# Patient Record
Sex: MALE | Race: WHITE | NOT HISPANIC OR LATINO | Employment: FULL TIME | URBAN - METROPOLITAN AREA
[De-identification: names, ages, dates, MRNs, and addresses within clinical notes are randomized per-mention and may not be internally consistent; named-entity substitution may affect disease eponyms.]

---

## 2023-06-14 LAB
APPEARANCE, URINE: ABNORMAL
BILIRUBIN, URINE: NEGATIVE
BLOOD, URINE: ABNORMAL
COLOR, URINE: YELLOW
GLUCOSE, URINE: NEGATIVE MG/DL
KETONES, URINE: NEGATIVE MG/DL
LEUKOCYTE ESTERASE, URINE: ABNORMAL
NITRITE, URINE: NEGATIVE
PH, URINE: 6 (ref 5–8)
PROTEIN, URINE: ABNORMAL MG/DL
RBC, URINE: 1808 /HPF (ref 0–5)
SPECIFIC GRAVITY, URINE: 1.01 (ref 1–1.03)
UROBILINOGEN, URINE: <2 MG/DL (ref 0–1.9)
WBC, URINE: ABNORMAL /HPF (ref 0–5)

## 2023-06-15 LAB — URINE CULTURE: NO GROWTH

## 2023-08-24 ENCOUNTER — HOSPITAL ENCOUNTER (OUTPATIENT)
Dept: DATA CONVERSION | Facility: HOSPITAL | Age: 63
End: 2023-08-24
Payer: COMMERCIAL

## 2023-08-24 DIAGNOSIS — N40.1 BENIGN PROSTATIC HYPERPLASIA WITH LOWER URINARY TRACT SYMPTOMS: ICD-10-CM

## 2023-10-24 ENCOUNTER — CLINICAL SUPPORT (OUTPATIENT)
Dept: PREADMISSION TESTING | Facility: HOSPITAL | Age: 63
End: 2023-10-24
Payer: COMMERCIAL

## 2023-10-24 VITALS
WEIGHT: 177.69 LBS | HEART RATE: 71 BPM | HEIGHT: 66 IN | DIASTOLIC BLOOD PRESSURE: 80 MMHG | RESPIRATION RATE: 16 BRPM | TEMPERATURE: 97.3 F | BODY MASS INDEX: 28.56 KG/M2 | SYSTOLIC BLOOD PRESSURE: 129 MMHG | OXYGEN SATURATION: 98 %

## 2023-10-24 DIAGNOSIS — Z01.818 PREOPERATIVE TESTING: Primary | ICD-10-CM

## 2023-10-24 LAB
ALBUMIN SERPL BCP-MCNC: 4.4 G/DL (ref 3.4–5)
ALP SERPL-CCNC: 63 U/L (ref 33–136)
ALT SERPL W P-5'-P-CCNC: 17 U/L (ref 10–52)
ANION GAP SERPL CALC-SCNC: 12 MMOL/L (ref 10–20)
APPEARANCE UR: CLEAR
AST SERPL W P-5'-P-CCNC: 19 U/L (ref 9–39)
BILIRUB SERPL-MCNC: 1.1 MG/DL (ref 0–1.2)
BILIRUB UR STRIP.AUTO-MCNC: NEGATIVE MG/DL
BUN SERPL-MCNC: 17 MG/DL (ref 6–23)
CALCIUM SERPL-MCNC: 9 MG/DL (ref 8.6–10.3)
CHLORIDE SERPL-SCNC: 103 MMOL/L (ref 98–107)
CO2 SERPL-SCNC: 29 MMOL/L (ref 21–32)
COLOR UR: YELLOW
CREAT SERPL-MCNC: 1.09 MG/DL (ref 0.5–1.3)
ERYTHROCYTE [DISTWIDTH] IN BLOOD BY AUTOMATED COUNT: 18.8 % (ref 11.5–14.5)
GFR SERPL CREATININE-BSD FRML MDRD: 77 ML/MIN/1.73M*2
GLUCOSE SERPL-MCNC: 100 MG/DL (ref 74–99)
GLUCOSE UR STRIP.AUTO-MCNC: NEGATIVE MG/DL
HCT VFR BLD AUTO: 39.3 % (ref 41–52)
HGB BLD-MCNC: 12.5 G/DL (ref 13.5–17.5)
HOLD SPECIMEN: NORMAL
KETONES UR STRIP.AUTO-MCNC: NEGATIVE MG/DL
LEUKOCYTE ESTERASE UR QL STRIP.AUTO: NEGATIVE
MCH RBC QN AUTO: 19.8 PG (ref 26–34)
MCHC RBC AUTO-ENTMCNC: 31.8 G/DL (ref 32–36)
MCV RBC AUTO: 62 FL (ref 80–100)
MUCOUS THREADS #/AREA URNS AUTO: NORMAL /LPF
NITRITE UR QL STRIP.AUTO: NEGATIVE
NRBC BLD-RTO: ABNORMAL /100{WBCS}
PH UR STRIP.AUTO: 6 [PH]
PLATELET # BLD AUTO: 230 X10*3/UL (ref 150–450)
PMV BLD AUTO: ABNORMAL FL
POTASSIUM SERPL-SCNC: 4.1 MMOL/L (ref 3.5–5.3)
PROT SERPL-MCNC: 6.5 G/DL (ref 6.4–8.2)
PROT UR STRIP.AUTO-MCNC: ABNORMAL MG/DL
RBC # BLD AUTO: 6.31 X10*6/UL (ref 4.5–5.9)
RBC # UR STRIP.AUTO: ABNORMAL /UL
RBC #/AREA URNS AUTO: NORMAL /HPF
SODIUM SERPL-SCNC: 140 MMOL/L (ref 136–145)
SP GR UR STRIP.AUTO: 1.02
SQUAMOUS #/AREA URNS AUTO: NORMAL /HPF
UROBILINOGEN UR STRIP.AUTO-MCNC: 0.2 MG/DL
WBC # BLD AUTO: 5.5 X10*3/UL (ref 4.4–11.3)
WBC #/AREA URNS AUTO: NORMAL /HPF

## 2023-10-24 PROCEDURE — 84443 ASSAY THYROID STIM HORMONE: CPT

## 2023-10-24 PROCEDURE — 81001 URINALYSIS AUTO W/SCOPE: CPT

## 2023-10-24 PROCEDURE — 80053 COMPREHEN METABOLIC PANEL: CPT

## 2023-10-24 PROCEDURE — 85027 COMPLETE CBC AUTOMATED: CPT

## 2023-10-24 PROCEDURE — 99203 OFFICE O/P NEW LOW 30 MIN: CPT

## 2023-10-24 PROCEDURE — 36415 COLL VENOUS BLD VENIPUNCTURE: CPT

## 2023-10-24 RX ORDER — TRIAMCINOLONE ACETONIDE 1 MG/G
1 CREAM TOPICAL DAILY PRN
COMMUNITY
Start: 2022-10-16

## 2023-10-24 RX ORDER — BISMUTH SUBSALICYLATE 262 MG
1 TABLET,CHEWABLE ORAL DAILY
COMMUNITY

## 2023-10-24 RX ORDER — HYDROCORTISONE 25 MG/G
1 CREAM TOPICAL AS NEEDED
COMMUNITY
Start: 2022-10-16

## 2023-10-24 RX ORDER — PREDNISONE 1 MG/1
1 TABLET ORAL EVERY MORNING
COMMUNITY
Start: 2023-10-11 | End: 2024-04-08

## 2023-10-24 RX ORDER — LEVOTHYROXINE SODIUM 175 UG/1
175 TABLET ORAL
COMMUNITY
Start: 2022-08-30 | End: 2023-11-01

## 2023-10-24 ASSESSMENT — DUKE ACTIVITY SCORE INDEX (DASI)
CAN YOU RUN A SHORT DISTANCE: YES
CAN YOU PARTICIPATE IN MODERATE RECREATIONAL ACTIVITIES LIKE GOLF, BOWLING, DANCING, DOUBLES TENNIS OR THROWING A BASEBALL OR FOOTBALL: YES
CAN YOU DO LIGHT WORK AROUND THE HOUSE LIKE DUSTING OR WASHING DISHES: YES
DASI METS SCORE: 8
CAN YOU WALK INDOORS, SUCH AS AROUND YOUR HOUSE: YES
CAN YOU DO YARD WORK LIKE RAKING LEAVES, WEEDING OR PUSHING A MOWER: YES
CAN YOU TAKE CARE OF YOURSELF (EAT, DRESS, BATHE, OR USE TOILET): YES
CAN YOU DO MODERATE WORK AROUND THE HOUSE LIKE VACUUMING, SWEEPING FLOORS OR CARRYING GROCERIES: YES
CAN YOU WALK A BLOCK OR TWO ON LEVEL GROUND: YES
TOTAL_SCORE: 42.7
CAN YOU CLIMB A FLIGHT OF STAIRS OR WALK UP A HILL: YES
CAN YOU HAVE SEXUAL RELATIONS: YES
CAN YOU PARTICIPATE IN STRENOUS SPORTS LIKE SWIMMING, SINGLES TENNIS, FOOTBALL, BASKETBALL, OR SKIING: NO
CAN YOU DO HEAVY WORK AROUND THE HOUSE LIKE SCRUBBING FLOORS OR LIFTING AND MOVING HEAVY FURNITURE: NO

## 2023-10-24 ASSESSMENT — CHADS2 SCORE
PRIOR STROKE OR TIA OR THROMBOEMBOLISM: NO
CHF: NO
HYPERTENSION: NO
AGE GREATER THAN OR EQUAL TO 75: NO
DIABETES: NO
CHADS2 SCORE: 0

## 2023-10-24 ASSESSMENT — PAIN - FUNCTIONAL ASSESSMENT: PAIN_FUNCTIONAL_ASSESSMENT: 0-10

## 2023-10-24 ASSESSMENT — PAIN SCALES - GENERAL: PAINLEVEL_OUTOF10: 0 - NO PAIN

## 2023-10-24 NOTE — H&P (VIEW-ONLY)
CPM/PAT Evaluation       Name: Dani Young (Dani Young)  /Age: 1960/63 y.o.     In-Person       Chief Complaint: BPH with LUTS    HPI  Patient is a 62 y/o alert and oriented male coming in for PAT for a holmium laser enucleation of the prostate scheduled on 2023 with Dr Magana. He reports urinary frequency, urgency, and nocturia without dysuria, hematuria, fevers, chills, and myalgias. Patient denies Cx pain, SOB, MUNOZ, and NVDC. Patient also denies Hx DVT/PE. Current medications were reviewed and a presurgical medication schedule was provided. He has no questions at this time.    PERSONAL REPORTS OF REACTIONS TO ANESTHESIA-PONV  NO PERSONAL REPORTS OF FAMILY HISTORY OF REACTIONS TO ANESTHESIA  NO PERSONAL REPORTS OF METAL, NICKEL, OR SHELLFISH ALLERGY  NONSMOKER-NEVER SMOKED  NO ETOH/DRUGS    Past Medical History:   Diagnosis Date    Anemia     Arthritis     BPH (benign prostatic hyperplasia)     Hypocalcemia 08/10/2018    Hypocalcemia    Hypothyroidism     Localized enlarged lymph nodes 2016    Cervical lymphadenopathy    Malignant melanoma of other part of trunk (CMS/HCC) 05/10/2019    Malignant melanoma of back    Nephrolithiasis     Nontoxic multinodular goiter 2014    Nontoxic multinodular goiter    Other conditions influencing health status     Nephrolithiasis    Personal history of malignant neoplasm of thyroid 05/10/2019    History of malignant neoplasm of thyroid    Personal history of malignant neoplasm of thyroid 2017    History of malignant neoplasm of thyroid    Personal history of other diseases of the digestive system 2014    History of inguinal hernia    Personal history of other specified conditions 04/10/2017    History of urinary frequency    PONV (postoperative nausea and vomiting)      Past Surgical History:   Procedure Laterality Date    COLONOSCOPY      LASIK      OTHER SURGICAL HISTORY  05/10/2019    Thyroidectomy total    OTHER SURGICAL HISTORY  05/10/2019     Inguinal hernia repair    PROSTATE SURGERY      SHOULDER SURGERY  05/10/2019    Shoulder Surgery    TOTAL HIP ARTHROPLASTY  05/10/2019    Hip Replacement    US GUIDED THYROID BIOPSY  09/10/2013    US GUIDED THYROID BIOPSY 9/10/2013 [facility] ANCILLARY LEGACY     Family History   Problem Relation Name Age of Onset    Diabetes Mother OLYA     Heart disease Father EDNA      Allergies   Allergen Reactions    Penicillins Rash and Unknown     Prior to Admission medications    Medication Sig Start Date End Date Taking? Authorizing Provider   hydrocortisone 2.5 % cream Apply 1 Application topically if needed for irritation. 10/16/22  Yes Historical Provider, MD   multivitamin tablet Take 1 tablet by mouth once daily.   Yes Historical Provider, MD   predniSONE (Deltasone) 1 mg tablet Take 1 tablet (1 mg) by mouth once daily in the morning. FOR PMR 10/11/23 4/8/24 Yes Historical Provider, MD   Synthroid 175 mcg tablet Take 1 tablet (175 mcg) by mouth once daily in the morning. Take before meals. 8/30/22  Yes Historical Provider, MD   triamcinolone (Kenalog) 0.1 % cream Apply 1 Application topically once daily as needed. 10/16/22  Yes Historical Provider, MD      Review of Systems   Constitutional: Negative for chills, decreased appetite, diaphoresis, fever and malaise/fatigue.   Eyes:  Negative for blurred vision and double vision.   Cardiovascular:  Negative for chest pain, claudication, cyanosis, dyspnea on exertion, irregular heartbeat, leg swelling, near-syncope and palpitations.   Respiratory:  Negative for cough, hemoptysis, shortness of breath and wheezing.    Endocrine: Negative for cold intolerance, heat intolerance, polydipsia, polyphagia and polyuria.   Gastrointestinal:  Negative for abdominal pain, constipation, diarrhea, dysphagia, nausea and vomiting.   Genitourinary:  Positive for frequency, urgency, and nocturia. Negative for bladder incontinence, dysuria, hematuria, incomplete emptying, pelvic pain.  "  Neurological:  Negative for headaches, light-headedness, paresthesias, sensory change and weakness.   Psychiatric/Behavioral:  Negative for altered mental status.       Vitals and nursing note reviewed. Physical exam within normal limits.   Constitutional:       Appearance: Normal appearance. He is normal weight.   HENT:      Head: Normocephalic and atraumatic.      Mouth/Throat:      Mouth: Mucous membranes are moist.      Pharynx: Oropharynx is clear.   Eyes:      Extraocular Movements: Extraocular movements intact.      Conjunctiva/sclera: Conjunctivae normal.      Pupils: Pupils are equal, round, and reactive to light.   Cardiovascular:      Rate and Rhythm: Normal rate and regular rhythm.      Pulses: Normal pulses.      Heart sounds: Normal heart sounds.   Pulmonary:      Effort: Pulmonary effort is normal.      Breath sounds: Normal breath sounds.   Abdominal:      General: Abdomen is flat. Bowel sounds are normal.      Palpations: Abdomen is soft.   Musculoskeletal:      Cervical back: Normal range of motion and neck supple.   Skin:     General: Skin is warm and dry.      Capillary Refill: Capillary refill takes less than 2 seconds.   Neurological:      General: No focal deficit present.      Mental Status: He is alert and oriented to person, place, and time. Mental status is at baseline.   Psychiatric:         Mood and Affect: Mood normal.         Behavior: Behavior normal.         Thought Content: Thought content normal.         Judgment: Judgment normal.     PAT AIRWAY:   Airway:     Mallampati::  I    TM distance::  >3 FB    Neck ROM::  Full  No missing teeth    Visit Vitals  /80   Pulse 71   Temp 36.3 °C (97.3 °F) (Tympanic)   Resp 16   Ht 1.676 m (5' 6\")   Wt 80.6 kg (177 lb 11.1 oz)   SpO2 98%   BMI 28.68 kg/m²   Smoking Status Never   BSA 1.94 m²        DASI Risk Score      Flowsheet Row Most Recent Value   DASI SCORE 42.7   METS Score (Will be calculated only when all the questions are " answered) 8          Caprini DVT Assessment      Flowsheet Row Most Recent Value   DVT Score 5   Current Status Major surgery planned, including arthroscopic and laproscopic (1-2 hours)   Age 60-75 years   BMI 30 or less          Modified Frailty Index      Flowsheet Row Most Recent Value   Modified Frailty Index Calculator 0          CHADS2 Stroke Risk         N/A 3 - 100%: High Risk   2 - 3%: Medium Risk   0 - 2%: Low Risk     Last Change: N/A          This score determines the patient's risk of having a stroke if the patient has atrial fibrillation.        This score is not applicable to this patient. Components are not calculated.          Revised Cardiac Risk Index      Flowsheet Row Most Recent Value   Revised Cardiac Risk Calculator 0          Apfel Simplified Score    No data to display       Risk Analysis Index Results This Encounter    No data found in the last 1 encounters.       Stop Bang Score      Flowsheet Row Most Recent Value   Do you snore loudly? 0   Do you often feel tired or fatigued after your sleep? 0   Has anyone ever observed you stop breathing in your sleep? 0   Do you have or are you being treated for high blood pressure? 0   Recent BMI (Calculated) 28.6   Is BMI greater than 35 kg/m2? 0=No   Age older than 50 years old? 1=Yes   Is your neck circumference greater than 17 inches (Male) or 16 inches (Female)? 0            Assessment and Plan:     BPH with LUTS- holmium laser enucleation of the prostate scheduled on 11/2/2023 with Dr Magana.    Hypothyroidism-Previous total thyroidectomy. Managed with Synthroid 175 mcg tablet. Will check TSH in PAT    Polymyalgia rheumatica-Managed with predniSONE (Deltasone) 1 mg tablet    Preoperative risk assessment  ASA II  RCRI-0 POINTS CLASS I RISK 3.9%  STOP-BANGS-1 POINTS LOW RISK FOR MOISES  NSQIP-PREDICTED LENGTH OF STAY 0.5 DAYS  ARISCAT-3 POINTS LOW RISK 1.6%  DASI-42.7 POINTS. 7.99 METS  HOLLY-0.1%  JHFRAT-3 POINTS LOW RISK FOR  FALLS  CLEARANCE-NA  PAT TESTING-CBC, CMP, UA, TSH    *CLEARED FOR SURGERY PENDING LABS/EKG-LABS REVIEWED, STABLE. OK TO PROCEED.    *FACE TO FACE TIME 20 MINUTES.

## 2023-10-24 NOTE — PREPROCEDURE INSTRUCTIONS
Medication List            Accurate as of October 24, 2023  7:31 AM. Always use your most recent med list.                hydrocortisone 2.5 % cream  Medication Adjustments for Surgery: Continue until night before surgery     multivitamin tablet  Medication Adjustments for Surgery: Stop 7 days before surgery     predniSONE 1 mg tablet  Commonly known as: Deltasone  Medication Adjustments for Surgery: Take morning of surgery with sip of water, no other fluids     Synthroid 175 mcg tablet  Generic drug: levothyroxine  Medication Adjustments for Surgery: Take morning of surgery with sip of water, no other fluids     triamcinolone 0.1 % cream  Commonly known as: Kenalog  Medication Adjustments for Surgery: Continue until night before surgery                              NPO Instructions:    Do not eat any food after midnight the night before your surgery/procedure.    Additional Instructions:     Seven/Six Days before Surgery:  Review your medication instructions, stop indicated medications  Five Days before Surgery:  Review your medication instructions, stop indicated medications  Three Days before Surgery:  Review your medication instructions, stop indicated medications  The Day before Surgery:  Review your medication instructions, stop indicated medications  You will be contacted regarding the time of your arrival to facility and surgery time  Do not eat any food after Midnight  Day of Surgery:  Review your medication instructions, take indicated medications  Wear  comfortable loose fitting clothing  Do not use moisturizers, creams, lotions or perfume  All jewelry and valuables should be left at home

## 2023-10-24 NOTE — CPM/PAT H&P
CPM/PAT Evaluation       Name: Dani Young (Dani Young)  /Age: 1960/63 y.o.     In-Person       Chief Complaint: BPH with LUTS    HPI  Patient is a 64 y/o alert and oriented male coming in for PAT for a holmium laser enucleation of the prostate scheduled on 2023 with Dr Magana. He reports urinary frequency, urgency, and nocturia without dysuria, hematuria, fevers, chills, and myalgias. Patient denies Cx pain, SOB, MUNOZ, and NVDC. Patient also denies Hx DVT/PE. Current medications were reviewed and a presurgical medication schedule was provided. He has no questions at this time.    PERSONAL REPORTS OF REACTIONS TO ANESTHESIA-PONV  NO PERSONAL REPORTS OF FAMILY HISTORY OF REACTIONS TO ANESTHESIA  NO PERSONAL REPORTS OF METAL, NICKEL, OR SHELLFISH ALLERGY  NONSMOKER-NEVER SMOKED  NO ETOH/DRUGS    Past Medical History:   Diagnosis Date    Anemia     Arthritis     BPH (benign prostatic hyperplasia)     Hypocalcemia 08/10/2018    Hypocalcemia    Hypothyroidism     Localized enlarged lymph nodes 2016    Cervical lymphadenopathy    Malignant melanoma of other part of trunk (CMS/HCC) 05/10/2019    Malignant melanoma of back    Nephrolithiasis     Nontoxic multinodular goiter 2014    Nontoxic multinodular goiter    Other conditions influencing health status     Nephrolithiasis    Personal history of malignant neoplasm of thyroid 05/10/2019    History of malignant neoplasm of thyroid    Personal history of malignant neoplasm of thyroid 2017    History of malignant neoplasm of thyroid    Personal history of other diseases of the digestive system 2014    History of inguinal hernia    Personal history of other specified conditions 04/10/2017    History of urinary frequency    PONV (postoperative nausea and vomiting)      Past Surgical History:   Procedure Laterality Date    COLONOSCOPY      LASIK      OTHER SURGICAL HISTORY  05/10/2019    Thyroidectomy total    OTHER SURGICAL HISTORY  05/10/2019     Inguinal hernia repair    PROSTATE SURGERY      SHOULDER SURGERY  05/10/2019    Shoulder Surgery    TOTAL HIP ARTHROPLASTY  05/10/2019    Hip Replacement    US GUIDED THYROID BIOPSY  09/10/2013    US GUIDED THYROID BIOPSY 9/10/2013 [facility] ANCILLARY LEGACY     Family History   Problem Relation Name Age of Onset    Diabetes Mother OLYA     Heart disease Father EDNA      Allergies   Allergen Reactions    Penicillins Rash and Unknown     Prior to Admission medications    Medication Sig Start Date End Date Taking? Authorizing Provider   hydrocortisone 2.5 % cream Apply 1 Application topically if needed for irritation. 10/16/22  Yes Historical Provider, MD   multivitamin tablet Take 1 tablet by mouth once daily.   Yes Historical Provider, MD   predniSONE (Deltasone) 1 mg tablet Take 1 tablet (1 mg) by mouth once daily in the morning. FOR PMR 10/11/23 4/8/24 Yes Historical Provider, MD   Synthroid 175 mcg tablet Take 1 tablet (175 mcg) by mouth once daily in the morning. Take before meals. 8/30/22  Yes Historical Provider, MD   triamcinolone (Kenalog) 0.1 % cream Apply 1 Application topically once daily as needed. 10/16/22  Yes Historical Provider, MD      Review of Systems   Constitutional: Negative for chills, decreased appetite, diaphoresis, fever and malaise/fatigue.   Eyes:  Negative for blurred vision and double vision.   Cardiovascular:  Negative for chest pain, claudication, cyanosis, dyspnea on exertion, irregular heartbeat, leg swelling, near-syncope and palpitations.   Respiratory:  Negative for cough, hemoptysis, shortness of breath and wheezing.    Endocrine: Negative for cold intolerance, heat intolerance, polydipsia, polyphagia and polyuria.   Gastrointestinal:  Negative for abdominal pain, constipation, diarrhea, dysphagia, nausea and vomiting.   Genitourinary:  Positive for frequency, urgency, and nocturia. Negative for bladder incontinence, dysuria, hematuria, incomplete emptying, pelvic pain.  "  Neurological:  Negative for headaches, light-headedness, paresthesias, sensory change and weakness.   Psychiatric/Behavioral:  Negative for altered mental status.       Vitals and nursing note reviewed. Physical exam within normal limits.   Constitutional:       Appearance: Normal appearance. He is normal weight.   HENT:      Head: Normocephalic and atraumatic.      Mouth/Throat:      Mouth: Mucous membranes are moist.      Pharynx: Oropharynx is clear.   Eyes:      Extraocular Movements: Extraocular movements intact.      Conjunctiva/sclera: Conjunctivae normal.      Pupils: Pupils are equal, round, and reactive to light.   Cardiovascular:      Rate and Rhythm: Normal rate and regular rhythm.      Pulses: Normal pulses.      Heart sounds: Normal heart sounds.   Pulmonary:      Effort: Pulmonary effort is normal.      Breath sounds: Normal breath sounds.   Abdominal:      General: Abdomen is flat. Bowel sounds are normal.      Palpations: Abdomen is soft.   Musculoskeletal:      Cervical back: Normal range of motion and neck supple.   Skin:     General: Skin is warm and dry.      Capillary Refill: Capillary refill takes less than 2 seconds.   Neurological:      General: No focal deficit present.      Mental Status: He is alert and oriented to person, place, and time. Mental status is at baseline.   Psychiatric:         Mood and Affect: Mood normal.         Behavior: Behavior normal.         Thought Content: Thought content normal.         Judgment: Judgment normal.     PAT AIRWAY:   Airway:     Mallampati::  I    TM distance::  >3 FB    Neck ROM::  Full  No missing teeth    Visit Vitals  /80   Pulse 71   Temp 36.3 °C (97.3 °F) (Tympanic)   Resp 16   Ht 1.676 m (5' 6\")   Wt 80.6 kg (177 lb 11.1 oz)   SpO2 98%   BMI 28.68 kg/m²   Smoking Status Never   BSA 1.94 m²        DASI Risk Score      Flowsheet Row Most Recent Value   DASI SCORE 42.7   METS Score (Will be calculated only when all the questions are " answered) 8          Caprini DVT Assessment      Flowsheet Row Most Recent Value   DVT Score 5   Current Status Major surgery planned, including arthroscopic and laproscopic (1-2 hours)   Age 60-75 years   BMI 30 or less          Modified Frailty Index      Flowsheet Row Most Recent Value   Modified Frailty Index Calculator 0          CHADS2 Stroke Risk         N/A 3 - 100%: High Risk   2 - 3%: Medium Risk   0 - 2%: Low Risk     Last Change: N/A          This score determines the patient's risk of having a stroke if the patient has atrial fibrillation.        This score is not applicable to this patient. Components are not calculated.          Revised Cardiac Risk Index      Flowsheet Row Most Recent Value   Revised Cardiac Risk Calculator 0          Apfel Simplified Score    No data to display       Risk Analysis Index Results This Encounter    No data found in the last 1 encounters.       Stop Bang Score      Flowsheet Row Most Recent Value   Do you snore loudly? 0   Do you often feel tired or fatigued after your sleep? 0   Has anyone ever observed you stop breathing in your sleep? 0   Do you have or are you being treated for high blood pressure? 0   Recent BMI (Calculated) 28.6   Is BMI greater than 35 kg/m2? 0=No   Age older than 50 years old? 1=Yes   Is your neck circumference greater than 17 inches (Male) or 16 inches (Female)? 0            Assessment and Plan:     BPH with LUTS- holmium laser enucleation of the prostate scheduled on 11/2/2023 with Dr Magana.    Hypothyroidism-Previous total thyroidectomy. Managed with Synthroid 175 mcg tablet. Will check TSH in PAT    Polymyalgia rheumatica-Managed with predniSONE (Deltasone) 1 mg tablet    Preoperative risk assessment  ASA II  RCRI-0 POINTS CLASS I RISK 3.9%  STOP-BANGS-1 POINTS LOW RISK FOR MOISES  NSQIP-PREDICTED LENGTH OF STAY 0.5 DAYS  ARISCAT-3 POINTS LOW RISK 1.6%  DASI-42.7 POINTS. 7.99 METS  HOLLY-0.1%  JHFRAT-3 POINTS LOW RISK FOR  FALLS  CLEARANCE-NA  PAT TESTING-CBC, CMP, UA, TSH    *CLEARED FOR SURGERY PENDING LABS/EKG-LABS REVIEWED, STABLE. OK TO PROCEED.    *FACE TO FACE TIME 20 MINUTES.

## 2023-10-26 LAB — TSH SERPL-ACNC: 7.69 MIU/L

## 2023-10-31 ENCOUNTER — PREP FOR PROCEDURE (OUTPATIENT)
Dept: UROLOGY | Facility: CLINIC | Age: 63
End: 2023-10-31
Payer: COMMERCIAL

## 2023-10-31 ENCOUNTER — ANESTHESIA EVENT (OUTPATIENT)
Dept: OPERATING ROOM | Facility: HOSPITAL | Age: 63
End: 2023-10-31
Payer: COMMERCIAL

## 2023-10-31 RX ORDER — SODIUM CHLORIDE, SODIUM LACTATE, POTASSIUM CHLORIDE, CALCIUM CHLORIDE 600; 310; 30; 20 MG/100ML; MG/100ML; MG/100ML; MG/100ML
100 INJECTION, SOLUTION INTRAVENOUS CONTINUOUS
Status: CANCELLED | OUTPATIENT
Start: 2023-10-31

## 2023-10-31 RX ORDER — CIPROFLOXACIN 2 MG/ML
400 INJECTION, SOLUTION INTRAVENOUS ONCE
Status: CANCELLED | OUTPATIENT
Start: 2023-10-31 | End: 2023-10-31

## 2023-11-01 DIAGNOSIS — E03.9 HYPOTHYROIDISM, UNSPECIFIED TYPE: Primary | ICD-10-CM

## 2023-11-01 RX ORDER — LEVOTHYROXINE SODIUM 175 UG/1
175 TABLET ORAL DAILY
Qty: 90 TABLET | Refills: 0 | Status: SHIPPED | OUTPATIENT
Start: 2023-11-01 | End: 2023-12-07 | Stop reason: SDUPTHER

## 2023-11-02 ENCOUNTER — ANESTHESIA (OUTPATIENT)
Dept: OPERATING ROOM | Facility: HOSPITAL | Age: 63
End: 2023-11-02
Payer: COMMERCIAL

## 2023-11-02 ENCOUNTER — HOSPITAL ENCOUNTER (OUTPATIENT)
Facility: HOSPITAL | Age: 63
Setting detail: OUTPATIENT SURGERY
Discharge: HOME | End: 2023-11-02
Attending: UROLOGY | Admitting: UROLOGY
Payer: COMMERCIAL

## 2023-11-02 VITALS
DIASTOLIC BLOOD PRESSURE: 98 MMHG | TEMPERATURE: 97 F | HEART RATE: 65 BPM | SYSTOLIC BLOOD PRESSURE: 126 MMHG | RESPIRATION RATE: 16 BRPM | OXYGEN SATURATION: 100 %

## 2023-11-02 DIAGNOSIS — N40.1 ENLARGED PROSTATE WITH URINARY OBSTRUCTION: ICD-10-CM

## 2023-11-02 DIAGNOSIS — N13.8 ENLARGED PROSTATE WITH URINARY OBSTRUCTION: ICD-10-CM

## 2023-11-02 PROBLEM — L30.9 ECZEMA: Status: ACTIVE | Noted: 2019-06-13

## 2023-11-02 PROBLEM — N40.0 BENIGN LOCALIZED HYPERPLASIA OF PROSTATE: Status: ACTIVE | Noted: 2023-11-02

## 2023-11-02 PROBLEM — R31.9 HEMATURIA: Status: ACTIVE | Noted: 2023-11-02

## 2023-11-02 PROBLEM — D18.00 ANGIOMA: Status: ACTIVE | Noted: 2019-06-13

## 2023-11-02 PROBLEM — Z85.820 PERSONAL HISTORY OF MALIGNANT MELANOMA OF SKIN: Status: ACTIVE | Noted: 2019-06-13

## 2023-11-02 PROBLEM — E03.9 HYPOTHYROIDISM: Status: ACTIVE | Noted: 2023-11-02

## 2023-11-02 PROBLEM — M50.30 DDD (DEGENERATIVE DISC DISEASE), CERVICAL: Status: ACTIVE | Noted: 2023-11-02

## 2023-11-02 PROBLEM — Z04.9 CONDITION NOT FOUND: Status: ACTIVE | Noted: 2019-06-13

## 2023-11-02 PROBLEM — R80.9 PROTEINURIA: Status: ACTIVE | Noted: 2023-11-02

## 2023-11-02 PROBLEM — M47.812 SPONDYLOSIS WITHOUT MYELOPATHY OR RADICULOPATHY, CERVICAL REGION: Status: ACTIVE | Noted: 2023-11-02

## 2023-11-02 PROBLEM — D36.7 BENIGN NEOPLASM OF LOWER EXTREMITY: Status: ACTIVE | Noted: 2019-06-13

## 2023-11-02 PROBLEM — R31.0 INTERMITTENT GROSS HEMATURIA: Status: ACTIVE | Noted: 2023-11-02

## 2023-11-02 PROBLEM — C73 THYROID CANCER (MULTI): Status: ACTIVE | Noted: 2023-11-02

## 2023-11-02 PROBLEM — D36.7 BENIGN NEOPLASM OF UPPER EXTREMITY: Status: ACTIVE | Noted: 2019-06-13

## 2023-11-02 PROBLEM — D64.9 ANEMIA: Status: ACTIVE | Noted: 2023-11-02

## 2023-11-02 PROBLEM — M54.12 CERVICAL RADICULOPATHY: Status: ACTIVE | Noted: 2023-11-02

## 2023-11-02 PROBLEM — D36.7 BENIGN NEOPLASM OF TRUNK: Status: ACTIVE | Noted: 2019-06-13

## 2023-11-02 PROBLEM — G56.03 BILATERAL CARPAL TUNNEL SYNDROME: Status: ACTIVE | Noted: 2023-11-02

## 2023-11-02 PROBLEM — L90.5 SCAR: Status: ACTIVE | Noted: 2019-06-13

## 2023-11-02 PROBLEM — K76.89 LIVER NODULE: Status: ACTIVE | Noted: 2023-11-02

## 2023-11-02 PROBLEM — R93.1 ELEVATED CORONARY ARTERY CALCIUM SCORE: Status: ACTIVE | Noted: 2023-11-02

## 2023-11-02 PROBLEM — D22.5 MELANOCYTIC NEVI OF TRUNK: Status: ACTIVE | Noted: 2019-06-13

## 2023-11-02 PROBLEM — D22.60 MELANOCYTIC NEVI OF UNSPECIFIED UPPER LIMB, INCLUDING SHOULDER: Status: ACTIVE | Noted: 2019-06-13

## 2023-11-02 PROCEDURE — 51702 INSERT TEMP BLADDER CATH: CPT

## 2023-11-02 PROCEDURE — 3700000001 HC GENERAL ANESTHESIA TIME - INITIAL BASE CHARGE: Performed by: UROLOGY

## 2023-11-02 PROCEDURE — 7100000002 HC RECOVERY ROOM TIME - EACH INCREMENTAL 1 MINUTE: Performed by: UROLOGY

## 2023-11-02 PROCEDURE — 3600000009 HC OR TIME - EACH INCREMENTAL 1 MINUTE - PROCEDURE LEVEL FOUR: Performed by: UROLOGY

## 2023-11-02 PROCEDURE — 7100000001 HC RECOVERY ROOM TIME - INITIAL BASE CHARGE: Performed by: UROLOGY

## 2023-11-02 PROCEDURE — 2500000004 HC RX 250 GENERAL PHARMACY W/ HCPCS (ALT 636 FOR OP/ED): Performed by: STUDENT IN AN ORGANIZED HEALTH CARE EDUCATION/TRAINING PROGRAM

## 2023-11-02 PROCEDURE — A52648 PR LASER VAPORIZATION SURGERY PROSTATE, COMPLETE: Performed by: STUDENT IN AN ORGANIZED HEALTH CARE EDUCATION/TRAINING PROGRAM

## 2023-11-02 PROCEDURE — 3700000002 HC GENERAL ANESTHESIA TIME - EACH INCREMENTAL 1 MINUTE: Performed by: UROLOGY

## 2023-11-02 PROCEDURE — 3600000004 HC OR TIME - INITIAL BASE CHARGE - PROCEDURE LEVEL FOUR: Performed by: UROLOGY

## 2023-11-02 PROCEDURE — 2500000004 HC RX 250 GENERAL PHARMACY W/ HCPCS (ALT 636 FOR OP/ED): Performed by: NURSE ANESTHETIST, CERTIFIED REGISTERED

## 2023-11-02 PROCEDURE — 88307 TISSUE EXAM BY PATHOLOGIST: CPT | Mod: TC | Performed by: UROLOGY

## 2023-11-02 PROCEDURE — 7100000009 HC PHASE TWO TIME - INITIAL BASE CHARGE: Performed by: UROLOGY

## 2023-11-02 PROCEDURE — 2500000004 HC RX 250 GENERAL PHARMACY W/ HCPCS (ALT 636 FOR OP/ED): Performed by: UROLOGY

## 2023-11-02 PROCEDURE — 52649 PROSTATE LASER ENUCLEATION: CPT | Performed by: UROLOGY

## 2023-11-02 PROCEDURE — A52648 PR LASER VAPORIZATION SURGERY PROSTATE, COMPLETE: Performed by: NURSE ANESTHETIST, CERTIFIED REGISTERED

## 2023-11-02 PROCEDURE — 2500000005 HC RX 250 GENERAL PHARMACY W/O HCPCS: Performed by: NURSE ANESTHETIST, CERTIFIED REGISTERED

## 2023-11-02 PROCEDURE — C1782 MORCELLATOR: HCPCS | Performed by: UROLOGY

## 2023-11-02 PROCEDURE — 88307 TISSUE EXAM BY PATHOLOGIST: CPT | Performed by: PATHOLOGY

## 2023-11-02 PROCEDURE — 51700 IRRIGATION OF BLADDER: CPT

## 2023-11-02 PROCEDURE — 2720000007 HC OR 272 NO HCPCS: Performed by: UROLOGY

## 2023-11-02 PROCEDURE — 2500000001 HC RX 250 WO HCPCS SELF ADMINISTERED DRUGS (ALT 637 FOR MEDICARE OP): Performed by: UROLOGY

## 2023-11-02 PROCEDURE — C1758 CATHETER, URETERAL: HCPCS | Performed by: UROLOGY

## 2023-11-02 PROCEDURE — 88307 TISSUE EXAM BY PATHOLOGIST: CPT | Mod: TC,SUR | Performed by: UROLOGY

## 2023-11-02 PROCEDURE — C1769 GUIDE WIRE: HCPCS | Performed by: UROLOGY

## 2023-11-02 PROCEDURE — 7100000010 HC PHASE TWO TIME - EACH INCREMENTAL 1 MINUTE: Performed by: UROLOGY

## 2023-11-02 RX ORDER — MEPERIDINE HYDROCHLORIDE 25 MG/ML
12.5 INJECTION INTRAMUSCULAR; INTRAVENOUS; SUBCUTANEOUS EVERY 10 MIN PRN
Status: DISCONTINUED | OUTPATIENT
Start: 2023-11-02 | End: 2023-11-02 | Stop reason: HOSPADM

## 2023-11-02 RX ORDER — MELOXICAM 15 MG/1
1 TABLET ORAL DAILY
COMMUNITY
Start: 2022-02-21 | End: 2023-12-01 | Stop reason: ALTCHOICE

## 2023-11-02 RX ORDER — SCOLOPAMINE TRANSDERMAL SYSTEM 1 MG/1
1 PATCH, EXTENDED RELEASE TRANSDERMAL
Status: DISCONTINUED | OUTPATIENT
Start: 2023-11-02 | End: 2023-11-02 | Stop reason: HOSPADM

## 2023-11-02 RX ORDER — DIPHENHYDRAMINE HYDROCHLORIDE 50 MG/ML
12.5 INJECTION INTRAMUSCULAR; INTRAVENOUS ONCE AS NEEDED
Status: DISCONTINUED | OUTPATIENT
Start: 2023-11-02 | End: 2023-11-02 | Stop reason: HOSPADM

## 2023-11-02 RX ORDER — IPRATROPIUM BROMIDE 0.5 MG/2.5ML
500 SOLUTION RESPIRATORY (INHALATION) ONCE
Status: DISCONTINUED | OUTPATIENT
Start: 2023-11-02 | End: 2023-11-02 | Stop reason: HOSPADM

## 2023-11-02 RX ORDER — SODIUM CHLORIDE, SODIUM LACTATE, POTASSIUM CHLORIDE, CALCIUM CHLORIDE 600; 310; 30; 20 MG/100ML; MG/100ML; MG/100ML; MG/100ML
100 INJECTION, SOLUTION INTRAVENOUS CONTINUOUS
Status: DISCONTINUED | OUTPATIENT
Start: 2023-11-02 | End: 2023-11-02 | Stop reason: HOSPADM

## 2023-11-02 RX ORDER — LABETALOL HYDROCHLORIDE 5 MG/ML
5 INJECTION, SOLUTION INTRAVENOUS ONCE AS NEEDED
Status: DISCONTINUED | OUTPATIENT
Start: 2023-11-02 | End: 2023-11-02 | Stop reason: HOSPADM

## 2023-11-02 RX ORDER — LIDOCAINE HYDROCHLORIDE 20 MG/ML
JELLY TOPICAL AS NEEDED
Status: DISCONTINUED | OUTPATIENT
Start: 2023-11-02 | End: 2023-11-02 | Stop reason: HOSPADM

## 2023-11-02 RX ORDER — ONDANSETRON HYDROCHLORIDE 2 MG/ML
4 INJECTION, SOLUTION INTRAVENOUS ONCE AS NEEDED
Status: DISCONTINUED | OUTPATIENT
Start: 2023-11-02 | End: 2023-11-02 | Stop reason: HOSPADM

## 2023-11-02 RX ORDER — HYDRALAZINE HYDROCHLORIDE 20 MG/ML
5 INJECTION INTRAMUSCULAR; INTRAVENOUS EVERY 30 MIN PRN
Status: DISCONTINUED | OUTPATIENT
Start: 2023-11-02 | End: 2023-11-02 | Stop reason: HOSPADM

## 2023-11-02 RX ORDER — CIPROFLOXACIN 2 MG/ML
400 INJECTION, SOLUTION INTRAVENOUS ONCE
Status: COMPLETED | OUTPATIENT
Start: 2023-11-02 | End: 2023-11-02

## 2023-11-02 RX ORDER — PROPOFOL 10 MG/ML
INJECTION, EMULSION INTRAVENOUS AS NEEDED
Status: DISCONTINUED | OUTPATIENT
Start: 2023-11-02 | End: 2023-11-02

## 2023-11-02 RX ORDER — NEOSTIGMINE METHYLSULFATE 1 MG/ML
INJECTION, SOLUTION INTRAVENOUS AS NEEDED
Status: DISCONTINUED | OUTPATIENT
Start: 2023-11-02 | End: 2023-11-02

## 2023-11-02 RX ORDER — FENTANYL CITRATE 50 UG/ML
INJECTION, SOLUTION INTRAMUSCULAR; INTRAVENOUS AS NEEDED
Status: DISCONTINUED | OUTPATIENT
Start: 2023-11-02 | End: 2023-11-02

## 2023-11-02 RX ORDER — ONDANSETRON HYDROCHLORIDE 2 MG/ML
INJECTION, SOLUTION INTRAVENOUS AS NEEDED
Status: DISCONTINUED | OUTPATIENT
Start: 2023-11-02 | End: 2023-11-02

## 2023-11-02 RX ORDER — ALBUTEROL SULFATE 0.83 MG/ML
2.5 SOLUTION RESPIRATORY (INHALATION) ONCE AS NEEDED
Status: DISCONTINUED | OUTPATIENT
Start: 2023-11-02 | End: 2023-11-02 | Stop reason: HOSPADM

## 2023-11-02 RX ORDER — CIPROFLOXACIN 500 MG/1
500 TABLET ORAL 2 TIMES DAILY
Qty: 14 TABLET | Refills: 0 | Status: SHIPPED | OUTPATIENT
Start: 2023-11-02 | End: 2023-11-09

## 2023-11-02 RX ORDER — ROCURONIUM BROMIDE 10 MG/ML
INJECTION, SOLUTION INTRAVENOUS AS NEEDED
Status: DISCONTINUED | OUTPATIENT
Start: 2023-11-02 | End: 2023-11-02

## 2023-11-02 RX ORDER — DEXAMETHASONE SODIUM PHOSPHATE 4 MG/ML
INJECTION, SOLUTION INTRA-ARTICULAR; INTRALESIONAL; INTRAMUSCULAR; INTRAVENOUS; SOFT TISSUE AS NEEDED
Status: DISCONTINUED | OUTPATIENT
Start: 2023-11-02 | End: 2023-11-02

## 2023-11-02 RX ORDER — MIDAZOLAM HYDROCHLORIDE 1 MG/ML
INJECTION, SOLUTION INTRAMUSCULAR; INTRAVENOUS AS NEEDED
Status: DISCONTINUED | OUTPATIENT
Start: 2023-11-02 | End: 2023-11-02

## 2023-11-02 RX ORDER — FENTANYL CITRATE 50 UG/ML
25 INJECTION, SOLUTION INTRAMUSCULAR; INTRAVENOUS EVERY 5 MIN PRN
Status: DISCONTINUED | OUTPATIENT
Start: 2023-11-02 | End: 2023-11-02 | Stop reason: HOSPADM

## 2023-11-02 RX ORDER — FENTANYL CITRATE 50 UG/ML
50 INJECTION, SOLUTION INTRAMUSCULAR; INTRAVENOUS EVERY 5 MIN PRN
Status: DISCONTINUED | OUTPATIENT
Start: 2023-11-02 | End: 2023-11-02 | Stop reason: HOSPADM

## 2023-11-02 RX ORDER — GLYCOPYRROLATE 0.2 MG/ML
INJECTION INTRAMUSCULAR; INTRAVENOUS AS NEEDED
Status: DISCONTINUED | OUTPATIENT
Start: 2023-11-02 | End: 2023-11-02

## 2023-11-02 RX ORDER — PHENAZOPYRIDINE HYDROCHLORIDE 100 MG/1
100 TABLET, FILM COATED ORAL 3 TIMES DAILY
Qty: 42 TABLET | Refills: 0 | Status: SHIPPED | OUTPATIENT
Start: 2023-11-02 | End: 2023-11-16

## 2023-11-02 RX ADMIN — ROCURONIUM BROMIDE 50 MG: 10 INJECTION, SOLUTION INTRAVENOUS at 10:28

## 2023-11-02 RX ADMIN — PROPOFOL 200 MG: 10 INJECTION, EMULSION INTRAVENOUS at 10:27

## 2023-11-02 RX ADMIN — ONDANSETRON 4 MG: 2 INJECTION INTRAMUSCULAR; INTRAVENOUS at 12:04

## 2023-11-02 RX ADMIN — SCOPALAMINE 1 PATCH: 1 PATCH, EXTENDED RELEASE TRANSDERMAL at 10:10

## 2023-11-02 RX ADMIN — SODIUM CHLORIDE, POTASSIUM CHLORIDE, SODIUM LACTATE AND CALCIUM CHLORIDE: 600; 310; 30; 20 INJECTION, SOLUTION INTRAVENOUS at 10:22

## 2023-11-02 RX ADMIN — FENTANYL CITRATE 50 MCG: 0.05 INJECTION, SOLUTION INTRAMUSCULAR; INTRAVENOUS at 11:51

## 2023-11-02 RX ADMIN — CIPROFLOXACIN 400 MG: 2 INJECTION, SOLUTION INTRAVENOUS at 10:23

## 2023-11-02 RX ADMIN — NEOSTIGMINE METHYLSULFATE 1.5 MG: 1 INJECTION INTRAVENOUS at 12:05

## 2023-11-02 RX ADMIN — FENTANYL CITRATE 100 MCG: 0.05 INJECTION, SOLUTION INTRAMUSCULAR; INTRAVENOUS at 11:01

## 2023-11-02 RX ADMIN — GLYCOPYRROLATE 0.3 MG: 0.2 INJECTION INTRAMUSCULAR; INTRAVENOUS at 12:05

## 2023-11-02 RX ADMIN — FENTANYL CITRATE 100 MCG: 0.05 INJECTION, SOLUTION INTRAMUSCULAR; INTRAVENOUS at 10:25

## 2023-11-02 RX ADMIN — MIDAZOLAM 2 MG: 1 INJECTION INTRAMUSCULAR; INTRAVENOUS at 10:22

## 2023-11-02 RX ADMIN — DEXAMETHASONE SODIUM PHOSPHATE 4 MG: 4 INJECTION, SOLUTION INTRAMUSCULAR; INTRAVENOUS at 11:46

## 2023-11-02 SDOH — HEALTH STABILITY: MENTAL HEALTH: CURRENT SMOKER: 0

## 2023-11-02 ASSESSMENT — PAIN SCALES - GENERAL

## 2023-11-02 ASSESSMENT — COLUMBIA-SUICIDE SEVERITY RATING SCALE - C-SSRS
1. IN THE PAST MONTH, HAVE YOU WISHED YOU WERE DEAD OR WISHED YOU COULD GO TO SLEEP AND NOT WAKE UP?: NO
6. HAVE YOU EVER DONE ANYTHING, STARTED TO DO ANYTHING, OR PREPARED TO DO ANYTHING TO END YOUR LIFE?: NO

## 2023-11-02 NOTE — ANESTHESIA POSTPROCEDURE EVALUATION
Patient: Dani Young    Procedure Summary       Date: 11/02/23 Room / Location: [facility] / [facility]    Anesthesia Start: 1022 Anesthesia Stop: 1223    Procedure: HOLMIUM LASER ENUCLEATION OF PROSTATE (P120 LASER, HoLEP SET, MORCELLATOR) Diagnosis:       Enlarged prostate with urinary obstruction      (N40.1)    Surgeons: Erum Magana MD Responsible Provider: Gareth Nieves MD    Anesthesia Type: general ASA Status: 2            Anesthesia Type: general    Vitals Value Taken Time   /76 11/02/23 1330   Temp 36.6 °C (97.9 °F) 11/02/23 1255   Pulse 49 11/02/23 1330   Resp 16 11/02/23 1330   SpO2 98 % 11/02/23 1330       Anesthesia Post Evaluation    Patient location during evaluation: PACU  Patient participation: complete - patient participated  Level of consciousness: awake  Pain management: adequate  Multimodal analgesia pain management approach  Airway patency: patent  Cardiovascular status: acceptable and hemodynamically stable  Respiratory status: acceptable and spontaneous ventilation  Hydration status: euvolemic        There were no known notable events for this encounter.

## 2023-11-02 NOTE — PERIOPERATIVE NURSING NOTE
Patient in Phase 2; dressed and up to chair with RN assist. Tolerating po fluids, no complaint of pain and no complaint of nausea.     Significant other at bedside; discussed discharge instructions with patient and Significant other. All questions at this time answered.     Patient clinically appropriate for discharge. IV removed and patient transported to discharge area via wheelchair.     Doctor Magana over to see patient and speak with family.  She said urine looks fine and encouraged patient to drink plenty of water.  Educated patient and his wife on how to care for holt catheter and supplies were give as well as  Doctor Magana's number if they have any problems

## 2023-11-02 NOTE — PROGRESS NOTES
Subjective   Patient ID: Dani Young is a 63 y.o. male presenting for TOV s/p HoLEP with Dr. Magana 11/02/2023    HPI  History of BPH with LUTS. He has been doing well since surgery. Urine has become clear, yellow without evidence of gross hematuria or clots. He denies any fevers or chills.    Review of Systems  All other systems have been reviewed and are negative for complaint.    Objective   Physical Exam  General: Well developed, well nourished, alert and cooperative, appears in no acute distress  Eyes: Non-injected conjunctiva, sclera clear, no proptosis  Cardiac: Extremities are warm and well perfused. No edema, cyanosis or pallor.   Lungs: Breathing is easy, non-labored. Speaking in clear and complete sentences. Normal diaphragmatic movement.  MSK: Ambulatory with steady gait, unassisted  Neuro: alert and oriented to person, place and time  Psych: Demonstrates good judgement and reason, without hallucinations, abnormal affect or abnormal behaviors.  Skin: no obvious lesions, no rashes.  : urine clear, blood tinged, no clots     Assessment/Plan   Diagnoses and all orders for this visit:  BPH with obstruction/lower urinary tract symptoms      We discussed postoperative urinary retention in great detail. We discussed that different medications, including anesthesia can influence urinary retention. We discussed that postoperative constipation can also contribute to urinary retention. We discussed management of urinary retention to include indwelling catheter or CIC. We discussed risks of unmanaged urinary retention including irreversible kidney injury and increased risk of UTI. We discussed TOV today.    [] TOV today passed   [] Drink plenty of fluids to maintain hydration and clear urine output  [] You may have some irritative voiding symptoms over the next few days: burning, frequency, urgency  [] Activity restrictions reviewed    He will return to clinic in 3 months for post-operative visit with Dr. Magana,  or sooner if needed.    All questions and concerns were addressed. Patient verbalizes understanding and has no other questions at this time.   Mindy Villalobos-- SANIA BRODERICK  Office Phone:  [phone]

## 2023-11-02 NOTE — ANESTHESIA PREPROCEDURE EVALUATION
Patient: Dani Young    Procedure Information       Date/Time: 11/02/23 0915    Procedure: HOLMIUM LASER ENUCLEATION OF PROSTATE (P120 LASER, HoLEP SET, MORCELLATOR)    Location: [facility] / [facility]    Surgeons: Erum Magana MD            Relevant Problems   Cardiovascular   (-) History of coronary artery bypass graft   (-) Pacemaker      Endocrine   (-) Diabetes mellitus, type 2 (CMS/HCC)      Neuro/Psych   (-) CVA (cerebral vascular accident) (CMS/[facility])   (-) Seizures (CMS/HCC)      Pulmonary   (-) Asthma   (-) Chronic obstructive pulmonary disease (CMS/[facility])   (-) MOISES (obstructive sleep apnea)      Hematology   (-) Coagulopathy (CMS/[facility])      Musculoskeletal  Polymyalgia rheumatica       Clinical information reviewed:   Tobacco  Allergies  Meds  Problems  Med Hx  Surg Hx   Fam Hx  Soc   Hx        NPO Detail:  NPO/Void Status  Date of Last Liquid: 11/01/23  Time of Last Liquid: 2100  Date of Last Solid: 11/01/23  Time of Last Solid: 2100  Last Intake Type: Light meal  Time of Last Void: 0746         Physical Exam    Airway  Mallampati: II  TM distance: >3 FB  Neck ROM: full     Cardiovascular    Dental    Pulmonary    Abdominal            Anesthesia Plan    ASA 2     general     The patient is not a current smoker.    intravenous induction   Postoperative administration of opioids is intended.  Anesthetic plan and risks discussed with patient.  Use of blood products discussed with patient who consented to blood products.

## 2023-11-02 NOTE — OP NOTE
HOLMIUM LASER ENUCLEATION OF PROSTATE (P120 LASER, HoLEP SET, MORCELLATOR) Operative Note     Date: 2023  OR Location: [facility]    Name: Dani Young : 1960, Age: 63 y.o., MRN: [id number], Sex: male    Diagnosis  Pre-op Diagnosis     * Enlarged prostate with urinary obstruction [N40.1, N13.8] Post-op Diagnosis     * Enlarged prostate with urinary obstruction [N40.1, N13.8]     Procedures    * HOLMIUM LASER ENUCLEATION OF PROSTATE (P120 LASER, HoLEP SET, MORCELLATOR)    Surgeons      * Erum Magana - Primary    Resident/Fellow/Other Assistant:  Surgeon(s) and Role:    Procedure Summary  Anesthesia: * No anesthesia type entered *  ASA: II  Anesthesia Staff: Anesthesiologist: Gareth Nieves MD  CRNA: Segundo Knox, APRN-CRNA; Mariela Wiley APRN-CRNA  Estimated Blood Loss: 10mL  Intra-op Medications:   Medication Name Total Dose   lidocaine (Uro-Jet) 2 % gel 1 Application   lactated Ringer's infusion 883.33 mL   scopolamine (Transderm-Scop) patch 1 patch 1 patch   ciprofloxacin (Cipro) IVPB 400 mg 400 mg              Anesthesia Record               Intraprocedure I/O Totals          Intake    Propofol Drip 20.00 mL    The total shown is the total volume documented since Anesthesia Start was filed.    lactated Ringer's infusion 1558.33 mL    ciprofloxacin (Cipro) IVPB 400 mg 200.00 mL    Total Intake 1778.33 mL       Output    Est. Blood Loss 50 mL    Total Output 50 mL       Net    Net Volume 1728.33 mL          Specimen:   ID Type Source Tests Collected by Time   1 : PROSTATE HOLEP Tissue PROSTATE HOLEP SURGICAL PATHOLOGY EXAM Erum Magana MD 2023 1044        Staff:   Circulator: Beatriz Castellon RN; Katia Michelle RN  Scrub Person: Akbar Tucker         Drains and/or Catheters:   Urethral Catheter  24 Fr. (Active)       Tourniquet Times:         Implants:     Findings: Large intravesical median lobe, extremely elevated bladder neck    Indications: Dani Young is an 63 y.o. male who is having  surgery for N40.1. BPH    The patient was seen in the preoperative area. The risks, benefits, complications, treatment options, non-operative alternatives, expected recovery and outcomes were discussed with the patient. The possibilities of reaction to medication, pulmonary aspiration, injury to surrounding structures, bleeding, recurrent infection, the need for additional procedures, failure to diagnose a condition, and creating a complication requiring transfusion or operation were discussed with the patient. The patient concurred with the proposed plan, giving informed consent.  The site of surgery was properly noted/marked if necessary per policy. The patient has been actively warmed in preoperative area. Preoperative antibiotics have been ordered and given within 1 hours of incision. Venous thrombosis prophylaxis have been ordered including bilateral sequential compression devices    Procedure Details: Preoperative diagnosis: BPH with LUTs    Postoperative diagnosis: same    Procedure: Holmium laser nucleation of prostate and tissue morcellation    Anesthesia: general    IVF: see anesthesia report    EBL: 5 ml    Complications: none    Catheters: 24 Fr 3-way Kelley catheter    Specimen: prostate chips    Disposition: PACU in stable condition       Enucleation time: 25  Total laser time: 47  Total energy used: 234,370       Patient is a 63 year-old male with significant obstructive and irritative voiding symptoms not responsive to maximal medical therapy.  Ambulatory evaluation demonstrated him to be a good candidate for HoLEP procedure.  He is s/p TURP x 2. Risks and alternatives were discussed in great detail, he singed the informed consent and agreed to proceed    50 ml of lubricant were injected to the urethra.  Urethral meatus was dilated with repeat sounds to 30 Fr caliber    A 26 Tristanian Castro resectoscope was inserted.  The anterior urethra was normal, there was good coaptation of the membranous urethra,  there was a large obstructing residual prostate and extremely high bladder neck.  Massive intravesical median lobe was seen.The bladder was normal without stones or lesions.  Both ureteral orifices were identified.    We started the enucleation using a 550 µm holmium laser fiber.    A circumferential incision was made in the urethra proximal to the sphincter.  It was deepened anteriorly and laterally.  We then entered the space between the capsule and the adenoma bluntly lateral to the verumontanum.  This was done bilaterally.  The posterior plane was developed bilaterally and connected by cutting the fibers proximal to the verumontanum.  The resection was carried as far proximally as possible.    We then returned to the initial incision in the urethra and detached the lateral attachments between the sphincter and the adenoma.  We were able to identify the lateral plane and developed it as proximally as possible by connecting it to the posterior plane.    We then turned to free the anterior portion of the sphincter.  This was extremely elevated.  The attachments between the sphincter and the anterior adenoma were taken down with the laser fiber until we met the anterior plan.  We then connected all planes circumferentially.  We continued the enucleation circumferentially until we reached the bladder neck.  The bladder neck was entered anteriorly and good hemostasis was obtained.  Then the bladder neck incision was developed circumferentially around the adenoma.  Adenoma was then rolled into the bladder and residual posterior attachments were removed.    Hemostasis was performed.The laser bridge was removed and the nephroscope loaded with the Piranha morcellator was inserted.  2 irrigations were connected to distended bladder.  Tissue was completely morcellated.    The laser bridge was inserted again and meticulous hemostasis was performed.  I verified that there was no residual tissue in the bladder, and that  ureteral orifices were free.    The laser apparatus was removed and a 24 English three-way catheter was inserted and connected to irrigation.    Patient was extubated and moved to the postoperative area in stable condition.         Disposition: patient will have a trial of void on postoperative day one    Complications:  None; patient tolerated the procedure well.    Disposition: PACU - hemodynamically stable.  Condition: stable         Additional Details: na    Attending Attestation: I was present and scrubbed for the entire procedure.    Erum Magana  Phone Number: [phone]

## 2023-11-02 NOTE — ANESTHESIA PROCEDURE NOTES
Airway  Date/Time: 11/2/2023 10:31 AM  Urgency: elective    Airway not difficult    Staffing  Performed: CRNA   Authorized by: Gareth Nieves MD    Performed by: DAVIE Garcia-MITCHELL  Patient location during procedure: OR    Indications and Patient Condition  Indications for airway management: anesthesia and airway protection  Spontaneous Ventilation: absent  Sedation level: deep  Preoxygenated: yes  Patient position: sniffing  MILS maintained throughout  Mask difficulty assessment: 1 - vent by mask  No planned trial extubation    Final Airway Details  Final airway type: endotracheal airway      Successful airway: ETT  Cuffed: yes   Successful intubation technique: direct laryngoscopy  Facilitating devices/methods: intubating stylet  Endotracheal tube insertion site: oral  Blade: Dayanna  Blade size: #4  ETT size (mm): 7.5  Cormack-Lehane Classification: grade IIb - view of arytenoids or posterior of glottis only  Placement verified by: chest auscultation and capnometry   Measured from: lips  ETT to lips (cm): 21  Number of attempts at approach: 1  Ventilation between attempts: none  Number of other approaches attempted: 0    Additional Comments  Atraumatic intubation, dentition unchanged.

## 2023-11-02 NOTE — PERIOPERATIVE NURSING NOTE
Patient having small amount of leaking from around the catheter.  Placed gauze around insertion site and given supplies.  Educated family to call if that continues or if he is only leaking from there and not having drainage in the bag

## 2023-11-02 NOTE — PERIOPERATIVE NURSING NOTE
Patient arrives earlier unresponsive to stimuli. Lungs mimi sinus beverley  nasal canula at 4 liters good rise and fall of chest with good air exchange. Kelley patient draining light pink ,with  continuous irrigation.   1243 arouses to voice talks with eyes shut nodding no to pain or nausea, Sinus beverley.

## 2023-11-03 ENCOUNTER — OFFICE VISIT (OUTPATIENT)
Dept: UROLOGY | Facility: HOSPITAL | Age: 63
End: 2023-11-03
Payer: COMMERCIAL

## 2023-11-03 DIAGNOSIS — N13.8 BPH WITH OBSTRUCTION/LOWER URINARY TRACT SYMPTOMS: Primary | ICD-10-CM

## 2023-11-03 DIAGNOSIS — N40.1 BPH WITH OBSTRUCTION/LOWER URINARY TRACT SYMPTOMS: Primary | ICD-10-CM

## 2023-11-03 PROCEDURE — 1036F TOBACCO NON-USER: CPT | Performed by: NURSE PRACTITIONER

## 2023-11-03 PROCEDURE — 51700 IRRIGATION OF BLADDER: CPT | Performed by: NURSE PRACTITIONER

## 2023-11-03 PROCEDURE — 99024 POSTOP FOLLOW-UP VISIT: CPT | Performed by: NURSE PRACTITIONER

## 2023-11-03 NOTE — PROGRESS NOTES
Dani Young is here for a trial of void. It was explained in detail what the procedure entails, patient understands. Bladder was filled to 150 ml  with sterile water without difficulty. Patient voided 150 ml leaving a residual of 0 ml. Patient tolerated the procedure well.

## 2023-11-06 ASSESSMENT — PAIN SCALES - GENERAL: PAINLEVEL_OUTOF10: 0 - NO PAIN

## 2023-11-07 LAB
LABORATORY COMMENT REPORT: NORMAL
PATH REPORT.FINAL DX SPEC: NORMAL
PATH REPORT.GROSS SPEC: NORMAL
PATH REPORT.RELEVANT HX SPEC: NORMAL
PATH REPORT.TOTAL CANCER: NORMAL

## 2023-12-01 ENCOUNTER — OFFICE VISIT (OUTPATIENT)
Dept: ENDOCRINOLOGY | Facility: CLINIC | Age: 63
End: 2023-12-01
Payer: COMMERCIAL

## 2023-12-01 VITALS
HEIGHT: 66 IN | HEART RATE: 76 BPM | RESPIRATION RATE: 16 BRPM | SYSTOLIC BLOOD PRESSURE: 124 MMHG | DIASTOLIC BLOOD PRESSURE: 72 MMHG | BODY MASS INDEX: 28.68 KG/M2

## 2023-12-01 DIAGNOSIS — C73 THYROID CANCER (MULTI): Primary | ICD-10-CM

## 2023-12-01 DIAGNOSIS — E89.0 POSTOPERATIVE HYPOTHYROIDISM: ICD-10-CM

## 2023-12-01 PROCEDURE — 99213 OFFICE O/P EST LOW 20 MIN: CPT | Performed by: INTERNAL MEDICINE

## 2023-12-01 PROCEDURE — 1036F TOBACCO NON-USER: CPT | Performed by: INTERNAL MEDICINE

## 2023-12-01 ASSESSMENT — ENCOUNTER SYMPTOMS
DIARRHEA: 0
CHILLS: 0
LIGHT-HEADEDNESS: 0
DIZZINESS: 0
SHORTNESS OF BREATH: 0
VOMITING: 0
FEVER: 0
NAUSEA: 0

## 2023-12-01 NOTE — PATIENT INSTRUCTIONS
Blood work today  Please take multivitamin separately from thyroid medication  Follow up in one year  Plan based on labs

## 2023-12-01 NOTE — PROGRESS NOTES
"Subjective   Patient ID: Dani Young is a 63 y.o. male who presents for Hypothyroidism and Thyroid Cancer.  HPI  Last seen one year ago.   Currently on labs only as has completed 5 yrs of active surveillance.   Since last year had recent laser procedure for prostate that went well.  Feels well.  Still babysitting.  Now has two grandchildren.   Discussed tsh off in the fall with another provider.  No change in dosing.   Does report currently taking t4 with mvi    10/13 thyroidectomy: multifocal ptc with 2/6+ln's  10/13 henry 100 mci  6/14 negative wbs  5/15 positive wbs and +u/s....150 mci HENRY  6/16 u/s +thyroid bed lesions.....ct negative  2/17 WBS negative tg stimulated 2.4  3/18 THW tg stimulated to 1.5, u/s with 2 ? small lymph nodes  12/18 u/s stable  2019 u/s: ? abnormal ln's...advised 6 months recheck, he forgot to repeat  2020 u/s stable from previous   Review of Systems   Constitutional:  Negative for chills and fever.   Respiratory:  Negative for shortness of breath.    Gastrointestinal:  Negative for diarrhea, nausea and vomiting.   Endocrine: Negative for cold intolerance and heat intolerance.   Neurological:  Negative for dizziness and light-headedness.       Objective      12/1/2023 9:35 AM   /72   Pulse 76   Resp 16   Weight    Height 1.676 m (5' 6\")            Physical Exam  Constitutional:       Appearance: Normal appearance. He is overweight.   HENT:      Head: Normocephalic and atraumatic.   Neck:      Comments: NO palpable thyroid gland  Cardiovascular:      Rate and Rhythm: Normal rate and regular rhythm.      Heart sounds: No murmur heard.     No gallop.   Pulmonary:      Effort: Pulmonary effort is normal.      Breath sounds: Normal breath sounds.   Abdominal:      Palpations: Abdomen is soft.      Comments: benign   Neurological:      General: No focal deficit present.      Mental Status: He is alert and oriented to person, place, and time.      Deep Tendon Reflexes: Reflexes are normal and " symmetric.   Psychiatric:         Behavior: Behavior is cooperative.         Assessment/Plan   Problem List Items Addressed This Visit             ICD-10-CM    Hypothyroidism E03.9    Thyroid cancer (CMS/HCC) - Primary C73    Relevant Orders    Thyroid Stimulating Hormone    Thyroxine, Free    Thyroglobulin and Antithyroglobulin     Discussed course and labs.  Tsh was off in fall.  Reviewed t4 and need to take it on empty stomach.   Labs today with adjust as needed.   If tg ok, follow up in one year

## 2023-12-07 ENCOUNTER — LAB (OUTPATIENT)
Dept: LAB | Facility: LAB | Age: 63
End: 2023-12-07
Payer: COMMERCIAL

## 2023-12-07 DIAGNOSIS — E03.9 HYPOTHYROIDISM, UNSPECIFIED TYPE: ICD-10-CM

## 2023-12-07 DIAGNOSIS — C73 THYROID CANCER (MULTI): ICD-10-CM

## 2023-12-07 LAB
T4 FREE SERPL-MCNC: 1.33 NG/DL (ref 0.78–1.48)
TSH SERPL-ACNC: 2.09 MIU/L (ref 0.44–3.98)

## 2023-12-07 PROCEDURE — 84443 ASSAY THYROID STIM HORMONE: CPT

## 2023-12-07 PROCEDURE — 86800 THYROGLOBULIN ANTIBODY: CPT

## 2023-12-07 PROCEDURE — 84432 ASSAY OF THYROGLOBULIN: CPT

## 2023-12-07 PROCEDURE — 36415 COLL VENOUS BLD VENIPUNCTURE: CPT

## 2023-12-07 PROCEDURE — 84439 ASSAY OF FREE THYROXINE: CPT

## 2023-12-07 RX ORDER — LEVOTHYROXINE SODIUM 175 UG/1
175 TABLET ORAL DAILY
Qty: 90 TABLET | Refills: 3 | Status: SHIPPED | OUTPATIENT
Start: 2023-12-07

## 2023-12-11 LAB
BILL ONLY-THYROGLOBULIN: NORMAL
THYROGLOB AB SERPL-ACNC: <0.9 IU/ML (ref 0–4)
THYROGLOB SERPL-MCNC: 0.1 NG/ML (ref 1.3–31.8)
THYROGLOB SERPL-MCNC: ABNORMAL NG/ML (ref 1.3–31.8)

## 2023-12-28 DIAGNOSIS — M21.371 RIGHT FOOT DROP: ICD-10-CM

## 2023-12-28 DIAGNOSIS — M54.16 LUMBAR RADICULOPATHY: Primary | ICD-10-CM

## 2023-12-28 NOTE — PROGRESS NOTES
I saw Dani in clinic today, he has been dealing with 2 months of low back pain with intermittent radiculopathy.    He was given an at home exercise program by me almost 8 weeks ago, he has been doing the George lumbar therapy exercises at least 4 days a week for the last 8 weeks.  His symptoms are progressively worsening despite therapy.  He has also been taking prednisone.  He is having increase difficulty walking, he is dragging his right foot. No incontinence of the bowel or bladder.    On exam, slight antalgic gait.  Full strength of lower extremities bilaterally with the exception of 3/5 of the right foot with dorsiflexion.  Positive straight leg raise on the right.    With his current symptoms of worsening pain despite at least 6 weeks of failed conservative treatment and new onset right lower extremity weakness a MRI of the lumbar spine was ordered today.  This is medically necessary to rule out acute disc herniation and lumbar stenosis.  His symptoms are consistent with possible disc herniation.  He will follow-up with me once the MRI is complete.    **This note was dictated using speech recognition software and was not corrected for spelling or grammatical errors**

## 2023-12-29 ENCOUNTER — HOSPITAL ENCOUNTER (OUTPATIENT)
Dept: RADIOLOGY | Facility: HOSPITAL | Age: 63
Discharge: HOME | End: 2023-12-29
Payer: COMMERCIAL

## 2023-12-29 DIAGNOSIS — M21.371 RIGHT FOOT DROP: ICD-10-CM

## 2023-12-29 DIAGNOSIS — M54.16 LUMBAR RADICULOPATHY: ICD-10-CM

## 2023-12-29 PROCEDURE — 72148 MRI LUMBAR SPINE W/O DYE: CPT | Performed by: RADIOLOGY

## 2023-12-29 PROCEDURE — 72148 MRI LUMBAR SPINE W/O DYE: CPT

## 2023-12-30 ENCOUNTER — TELEPHONE (OUTPATIENT)
Dept: ORTHOPEDIC SURGERY | Facility: CLINIC | Age: 63
End: 2023-12-30
Payer: COMMERCIAL

## 2023-12-30 DIAGNOSIS — M54.16 LUMBAR RADICULOPATHY: Primary | ICD-10-CM

## 2023-12-30 RX ORDER — PREDNISONE 20 MG/1
20 TABLET ORAL DAILY
Qty: 10 TABLET | Refills: 1 | Status: SHIPPED | OUTPATIENT
Start: 2023-12-30 | End: 2024-01-19

## 2024-01-02 NOTE — TELEPHONE ENCOUNTER
Dani continues to have low back pain, increasing radicular symptoms. A new prescription for prednisone 20 mg was sent to his pharmacy.

## 2024-02-20 ENCOUNTER — OFFICE VISIT (OUTPATIENT)
Dept: UROLOGY | Facility: CLINIC | Age: 64
End: 2024-02-20
Payer: COMMERCIAL

## 2024-02-20 DIAGNOSIS — N13.8 BPH WITH OBSTRUCTION/LOWER URINARY TRACT SYMPTOMS: Primary | ICD-10-CM

## 2024-02-20 DIAGNOSIS — N40.1 BPH WITH OBSTRUCTION/LOWER URINARY TRACT SYMPTOMS: Primary | ICD-10-CM

## 2024-02-20 PROCEDURE — 99213 OFFICE O/P EST LOW 20 MIN: CPT | Performed by: UROLOGY

## 2024-02-20 PROCEDURE — 1036F TOBACCO NON-USER: CPT | Performed by: UROLOGY

## 2024-02-20 PROCEDURE — 51798 US URINE CAPACITY MEASURE: CPT | Performed by: UROLOGY

## 2024-02-20 PROCEDURE — 51741 ELECTRO-UROFLOWMETRY FIRST: CPT | Performed by: UROLOGY

## 2024-02-20 NOTE — PROGRESS NOTES
Dani Young is a 63 y.o. male with history of BPH with LUTS s/p HoLEP on 11/2/2023 who presents for 3 month post-op visit. Patient reports his urinary symptoms have improved significantly since the procedure. He has occasional urinary urgency which is not bothersome. He is overall happy with the outcome of the procedure.        Path: 69.05g of tissue removed with no evidence of cancer.       IPSS: 6 and 0   PVR: 0 ML   Uroflow was poorly diagnostic only voided 22cc's    Current Outpatient Medications on File Prior to Visit   Medication Sig Dispense Refill    hydrocortisone 2.5 % cream Apply 1 Application topically if needed for irritation.      levothyroxine (Synthroid, Levoxyl) 175 mcg tablet Take 1 tablet (175 mcg) by mouth once daily. 90 tablet 3    multivitamin tablet Take 1 tablet by mouth once daily.      predniSONE (Deltasone) 1 mg tablet Take 1 tablet (1 mg) by mouth once daily in the morning. FOR PMR      triamcinolone (Kenalog) 0.1 % cream Apply 1 Application topically once daily as needed.       No current facility-administered medications on file prior to visit.     Lab Results   Component Value Date    PSA 3.66 04/09/2022    PSA 1.93 12/09/2020    PSA 1.77 06/04/2019       Plan:    BPH with LUTS s/p HoLEP on 11/2/2023     We will check PSA to establish post-op PSA.     Follow up in 1 year.     All questions were answered to the patient's satisfaction. Patient agrees with the plan and wishes to proceed. Follow-up will be scheduled appropriately.     Scribed for Dr. Magana by Yun Thompson. I , Dr Magana, have personally reviewed and agreed with the information entered by the Virtual Scribe.

## 2024-02-21 ENCOUNTER — LAB (OUTPATIENT)
Dept: LAB | Facility: LAB | Age: 64
End: 2024-02-21
Payer: COMMERCIAL

## 2024-02-21 DIAGNOSIS — N40.1 BPH WITH OBSTRUCTION/LOWER URINARY TRACT SYMPTOMS: ICD-10-CM

## 2024-02-21 DIAGNOSIS — N13.8 BPH WITH OBSTRUCTION/LOWER URINARY TRACT SYMPTOMS: ICD-10-CM

## 2024-02-21 LAB — PSA SERPL-MCNC: 0.27 NG/ML

## 2024-02-21 PROCEDURE — 84153 ASSAY OF PSA TOTAL: CPT

## 2024-02-21 PROCEDURE — 36415 COLL VENOUS BLD VENIPUNCTURE: CPT

## 2024-04-09 ENCOUNTER — TELEMEDICINE (OUTPATIENT)
Dept: UROLOGY | Facility: CLINIC | Age: 64
End: 2024-04-09
Payer: COMMERCIAL

## 2024-04-09 DIAGNOSIS — R97.20 HIGH PROSTATE SPECIFIC ANTIGEN (PSA): ICD-10-CM

## 2024-04-09 DIAGNOSIS — N13.8 BPH WITH OBSTRUCTION/LOWER URINARY TRACT SYMPTOMS: ICD-10-CM

## 2024-04-09 DIAGNOSIS — N52.9 ERECTILE DYSFUNCTION, UNSPECIFIED ERECTILE DYSFUNCTION TYPE: ICD-10-CM

## 2024-04-09 DIAGNOSIS — N40.1 BPH WITH OBSTRUCTION/LOWER URINARY TRACT SYMPTOMS: ICD-10-CM

## 2024-04-09 PROCEDURE — 99214 OFFICE O/P EST MOD 30 MIN: CPT | Performed by: UROLOGY

## 2024-04-09 RX ORDER — TADALAFIL 10 MG/1
10 TABLET ORAL DAILY PRN
Qty: 30 TABLET | Refills: 6 | Status: SHIPPED | OUTPATIENT
Start: 2024-04-09 | End: 2025-04-09

## 2024-04-09 NOTE — PROGRESS NOTES
Scribed for Dr. Erum Magana by Bernabe Trevizo.  I, Dr. Erum Magana, have personally reviewed and agreed with the information entered by the Virtual Scribe. 04/09/24    This visit was completed via telemedicine. All issues as below were discussed and addressed but no physical exam was performed unless allowed by visual confirmation. If it was felt that the patient should be evaluated in clinic, then they were directed there. Patient verbal consented to the visit.    History of Present Illness:  TODAY: (04/09/24)  Dani Young is a 63 y.o. male with history of BPH s/p HoLEP with me (11/02/23). 69g removed, pathology showed benign tissue. Presents virtually for a follow up visit and a post-op PSA results. Latest PSA 0.27 (Feb 2024), patient reassured. He remains very satisfied with the results of the procedure, obstructive voiding symptoms now resolved.     Regarding his sexual health.   Reports persistent and bothersome ED.   Has not tried any medications for this in the past.   Receptive to starting now.     TO REVIEW: Last visit (02/20/24)  history of BPH with LUTS s/p HoLEP on 11/2/2023 who presents for 3 month post-op visit. Patient reports his urinary symptoms have improved significantly since the procedure. He has occasional urinary urgency which is not bothersome. He is overall happy with the outcome of the procedure.      Path: 69.05g of tissue removed with no evidence of cancer.     IPSS: 6 and 0   PVR: 0 ML   Uroflow was poorly diagnostic only voided 22cc's    Past Medical History:   Diagnosis Date    Anemia     Arthritis     BPH (benign prostatic hyperplasia)     Hypocalcemia 08/10/2018    Hypocalcemia    Hypothyroidism     Localized enlarged lymph nodes 06/14/2016    Cervical lymphadenopathy    Malignant melanoma of other part of trunk (CMS/HCC) 05/10/2019    Malignant melanoma of back    Nephrolithiasis     Nontoxic multinodular goiter 04/18/2014    Nontoxic multinodular goiter    Other conditions  influencing health status     Nephrolithiasis    Personal history of malignant neoplasm of thyroid 05/10/2019    History of malignant neoplasm of thyroid    Personal history of malignant neoplasm of thyroid 2017    History of malignant neoplasm of thyroid    Personal history of other diseases of the digestive system 2014    History of inguinal hernia    Personal history of other specified conditions 04/10/2017    History of urinary frequency    PONV (postoperative nausea and vomiting)      Past Surgical History:   Procedure Laterality Date    COLONOSCOPY      LASIK      OTHER SURGICAL HISTORY  05/10/2019    Thyroidectomy total    OTHER SURGICAL HISTORY  05/10/2019    Inguinal hernia repair    PROSTATE SURGERY      SHOULDER SURGERY  05/10/2019    Shoulder Surgery    TOTAL HIP ARTHROPLASTY  05/10/2019    Hip Replacement    US GUIDED THYROID BIOPSY  09/10/2013    US GUIDED THYROID BIOPSY 9/10/2013 [facility]     Family History   Problem Relation Name Age of Onset    Stroke Mother OLYA     Diabetes Mother OLYA     Heart attack Mother OLYA         ACUTE MYOCARDIAL INFARCTION INVOLVING OTHER CORONARY ARTERY    Heart attack Father EDNA          AT AGE 62    Heart disease Father EDNA      Social History     Tobacco Use   Smoking Status Never   Smokeless Tobacco Never     Current Outpatient Medications   Medication Sig Dispense Refill    hydrocortisone 2.5 % cream Apply 1 Application topically if needed for irritation.      levothyroxine (Synthroid, Levoxyl) 175 mcg tablet Take 1 tablet (175 mcg) by mouth once daily. 90 tablet 3    multivitamin tablet Take 1 tablet by mouth once daily.      triamcinolone (Kenalog) 0.1 % cream Apply 1 Application topically once daily as needed.       No current facility-administered medications for this visit.     Allergies   Allergen Reactions    Penicillins Rash and Unknown     Past medical, surgical, family and social history in the chart was  reviewed and is accurate including any additions to what is in this HPI.    Review of systems:   Pertinent information as listed in the HPI.        Objective   There were no vitals taken for this visit.  Physical Exam:  Exam limited 2/2 being a telehealth visit.     Lab Review:  Lab Results   Component Value Date    WBC 5.5 10/24/2023    RBC 6.31 (H) 10/24/2023    HGB 12.5 (L) 10/24/2023    HCT 39.3 (L) 10/24/2023     10/24/2023      Lab Results   Component Value Date    BUN 17 10/24/2023    CREATININE 1.09 10/24/2023      Lab Results   Component Value Date    PSA 0.27 02/21/2024     Lab Results   Component Value Date    CHOL 122 04/09/2022    TRIG 99 04/09/2022    HDL 41.4 04/09/2022    ALT 17 10/24/2023    AST 19 10/24/2023     10/24/2023    K 4.1 10/24/2023     10/24/2023    CO2 29 10/24/2023    TSH 2.09 12/07/2023        ASSESSMENT:  Problem List Items Addressed This Visit       BPH with obstruction/lower urinary tract symptoms     Other Visit Diagnoses       Erectile dysfunction, unspecified erectile dysfunction type               PLAN:  1. BPH s/p HoLEP with me (11/02/23)  69g removed, pathology showed benign tissue.     Post-op PSA 0.27 (Feb 2024). Patient reassured.   Reports he has been doing well overall.   Remains very satisfied with results of HoLEP.   Denies any obstructive urinary issues.   RTC  in 1 year for reassessment, and UA/PVR.     2. Erectile dysfunction  C/o bothersome issues with ED.  Has not tried medications in the past for this.   Now receptive to initiating medical treatment.   Opts to trial course of Cialis PRN, risks discussed.   Follow up in 6-8 weeks for med check.   The patient has been diagnosed with ED and cardiac assessment according to the Sawyer criteria allows use of oral PDE-5 inhibitor. The patient understands that these medications are not initiators of erection and that he will still require sexual stimulation. If prescribed vardenafil or sildenafil,  he was advised to take the medication 30-60 minutes prior to sexual activity and that the efficacy of the medication is decreased following a high-fat meal.     The patient verbalized understanding that nitrates such as nitroglycerine, isosorbide mononitrate, isosorbide dinitrate and any other nitrate preparations are absolutely contradicted with the use of a PDE-5 inhibitor. The patient was advised to alert any medical personal of PDE- 5 inhibitor use should he seek urgent medical attention for any reason.     I explained the common adverse effects of therapy including but not limited to headache, flushing, dizziness, rash, upset stomach, diarrhea, nasal congestion, abnormal vision, back pain, and myalgia. Less common side effects are lightheadedness or blood pressure drop upon standing. We discussed that patients have  after using medications in this class, and sometimes the exact cause of death was not able to be determined. There is a very small risk of nonarteritic ischemic optic neuropathy, a rare cause of blindness that may be permanent while using medications in this class. Patient is instructed to immediately stop this medication and seek medical attention if he develops visual disturbances in one or both eyes.     We discussed that if he experiences erection lasting longer than four hours, he needs to seek immediate treatment at the ER as the longer he waits, the more damage is done to the penile tissue. The patient states that he is not withholding any information about his condition or the use of medications in order to receive a PDE5 inhibitor class medication. No barriers to learning were identified. After all of the patients' questions were satisfactorily answered, he expressed understanding the risks of therapy and wishes to proceed.       All questions were answered to the patient’s satisfaction.  Patient agrees with the plan and wishes to proceed.  Continue follow-up for ongoing care of his  chronic medical conditions.    Scribed for Dr. Erum Magana by Bernabe Trevizo.  I, Dr. Erum Magana, have personally reviewed and agreed with the information entered by the Virtual Scribe. 04/09/24

## 2024-04-30 ENCOUNTER — APPOINTMENT (OUTPATIENT)
Dept: UROLOGY | Facility: CLINIC | Age: 64
End: 2024-04-30
Payer: COMMERCIAL

## 2024-10-14 ENCOUNTER — TELEPHONE (OUTPATIENT)
Dept: PRIMARY CARE | Facility: CLINIC | Age: 64
End: 2024-10-14
Payer: COMMERCIAL

## 2024-10-16 ENCOUNTER — HOSPITAL ENCOUNTER (OUTPATIENT)
Dept: RADIOLOGY | Facility: CLINIC | Age: 64
Discharge: HOME | End: 2024-10-16
Payer: COMMERCIAL

## 2024-10-16 ENCOUNTER — OFFICE VISIT (OUTPATIENT)
Dept: PRIMARY CARE | Facility: CLINIC | Age: 64
End: 2024-10-16
Payer: COMMERCIAL

## 2024-10-16 VITALS
BODY MASS INDEX: 27.97 KG/M2 | HEART RATE: 88 BPM | OXYGEN SATURATION: 100 % | HEIGHT: 66 IN | TEMPERATURE: 97.7 F | DIASTOLIC BLOOD PRESSURE: 78 MMHG | SYSTOLIC BLOOD PRESSURE: 122 MMHG | WEIGHT: 174 LBS

## 2024-10-16 DIAGNOSIS — N50.811 PAIN IN RIGHT TESTICLE: ICD-10-CM

## 2024-10-16 DIAGNOSIS — K40.90 UNILATERAL INGUINAL HERNIA WITHOUT OBSTRUCTION OR GANGRENE, RECURRENCE NOT SPECIFIED: Primary | ICD-10-CM

## 2024-10-16 PROBLEM — G56.00 CARPAL TUNNEL SYNDROME: Status: ACTIVE | Noted: 2024-10-16

## 2024-10-16 PROCEDURE — 76870 US EXAM SCROTUM: CPT

## 2024-10-16 PROCEDURE — 76870 US EXAM SCROTUM: CPT | Performed by: STUDENT IN AN ORGANIZED HEALTH CARE EDUCATION/TRAINING PROGRAM

## 2024-10-16 PROCEDURE — 99214 OFFICE O/P EST MOD 30 MIN: CPT | Performed by: FAMILY MEDICINE

## 2024-10-16 PROCEDURE — 1036F TOBACCO NON-USER: CPT | Performed by: FAMILY MEDICINE

## 2024-10-16 PROCEDURE — 3008F BODY MASS INDEX DOCD: CPT | Performed by: FAMILY MEDICINE

## 2024-10-16 RX ORDER — PREDNISONE 1 MG/1
1 TABLET ORAL
COMMUNITY
Start: 2024-09-18 | End: 2024-10-16 | Stop reason: ALTCHOICE

## 2024-10-16 ASSESSMENT — ENCOUNTER SYMPTOMS
CONSTIPATION: 0
VOMITING: 0
BLOOD IN STOOL: 0
DYSURIA: 0
FATIGUE: 0
DYSPHORIC MOOD: 0
DIARRHEA: 0
HEADACHES: 0
SLEEP DISTURBANCE: 0
PALPITATIONS: 0
DIFFICULTY URINATING: 0
DIZZINESS: 0
MYALGIAS: 0
SHORTNESS OF BREATH: 0
NAUSEA: 0

## 2024-10-16 ASSESSMENT — PATIENT HEALTH QUESTIONNAIRE - PHQ9
SUM OF ALL RESPONSES TO PHQ9 QUESTIONS 1 AND 2: 0
2. FEELING DOWN, DEPRESSED OR HOPELESS: NOT AT ALL
1. LITTLE INTEREST OR PLEASURE IN DOING THINGS: NOT AT ALL

## 2024-10-16 NOTE — PROGRESS NOTES
"Subjective   Patient ID: Dani Young is a 64 y.o. male who presents for New Patient Visit and Groin Pain (Pain in upper R side groin x 3 weeks    [facility]).    HPI     Rt side groin pain x 3 wks. Worse w/ standing and walking/activity. Active. Pressure alleviates. Min discomfort in testicle. No assoc N/V/F/Chills.     Review of Systems   Constitutional:  Negative for fatigue.   Eyes:  Negative for visual disturbance.   Respiratory:  Negative for shortness of breath.    Cardiovascular:  Negative for chest pain and palpitations.   Gastrointestinal:  Negative for blood in stool, constipation, diarrhea, nausea and vomiting.   Genitourinary:  Negative for difficulty urinating and dysuria.   Musculoskeletal:  Negative for myalgias.   Skin:  Negative for rash.   Neurological:  Negative for dizziness and headaches.   Psychiatric/Behavioral:  Negative for dysphoric mood and sleep disturbance.        Objective   /78   Pulse 88   Temp 36.5 °C (97.7 °F)   Ht 1.676 m (5' 6\")   Wt 78.9 kg (174 lb)   SpO2 100%   BMI 28.08 kg/m²     Physical Exam  Vitals and nursing note reviewed. Chaperone present: declines chapperone.   Constitutional:       General: He is not in acute distress.     Appearance: Normal appearance. He is not toxic-appearing.   HENT:      Head: Normocephalic.   Eyes:      Pupils: Pupils are equal, round, and reactive to light.   Cardiovascular:      Rate and Rhythm: Normal rate and regular rhythm.      Pulses: Normal pulses.      Heart sounds: No murmur heard.  Pulmonary:      Effort: Pulmonary effort is normal. No respiratory distress.      Breath sounds: Normal breath sounds.   Abdominal:      General: Bowel sounds are normal.      Palpations: Abdomen is soft.      Tenderness: There is abdominal tenderness (rt inguinal area.). There is no guarding.      Hernia: A hernia (small fullness rt inguinal area) is present. Hernia is present in the right inguinal area.   Genitourinary:     Testes:         Right: " Tenderness present. Mass not present.   Musculoskeletal:         General: No tenderness.   Skin:     General: Skin is warm.   Neurological:      General: No focal deficit present.      Mental Status: He is alert.      Cranial Nerves: No cranial nerve deficit.   Psychiatric:         Mood and Affect: Mood normal.         Assessment/Plan   Problem List Items Addressed This Visit    None  Visit Diagnoses         Codes    Unilateral inguinal hernia without obstruction or gangrene, recurrence not specified    -  Primary K40.90    Relevant Orders    Referral to General Surgery    Pain in right testicle     N50.811    Relevant Orders    US scrotum        Discussed concern for hernia. Recommendations given

## 2024-10-16 NOTE — PATIENT INSTRUCTIONS
You were referred for US and to general surgery    Go to the ER if any of your symptoms are significantly worsening.     Return in 1 month for recheck and physical, sooner if needed

## 2024-10-17 ENCOUNTER — OFFICE VISIT (OUTPATIENT)
Dept: SURGERY | Facility: CLINIC | Age: 64
End: 2024-10-17
Payer: COMMERCIAL

## 2024-10-17 ENCOUNTER — HOSPITAL ENCOUNTER (OUTPATIENT)
Facility: HOSPITAL | Age: 64
Setting detail: OUTPATIENT SURGERY
End: 2024-10-17
Attending: SURGERY | Admitting: SURGERY
Payer: COMMERCIAL

## 2024-10-17 VITALS
SYSTOLIC BLOOD PRESSURE: 110 MMHG | HEART RATE: 82 BPM | WEIGHT: 172 LBS | BODY MASS INDEX: 27.64 KG/M2 | HEIGHT: 66 IN | DIASTOLIC BLOOD PRESSURE: 79 MMHG

## 2024-10-17 DIAGNOSIS — K40.90 UNILATERAL INGUINAL HERNIA WITHOUT OBSTRUCTION OR GANGRENE, RECURRENCE NOT SPECIFIED: Primary | ICD-10-CM

## 2024-10-17 PROCEDURE — 99213 OFFICE O/P EST LOW 20 MIN: CPT | Performed by: SURGERY

## 2024-10-17 PROCEDURE — 3008F BODY MASS INDEX DOCD: CPT | Performed by: SURGERY

## 2024-10-17 PROCEDURE — 99203 OFFICE O/P NEW LOW 30 MIN: CPT | Performed by: SURGERY

## 2024-10-17 ASSESSMENT — PAIN SCALES - GENERAL: PAINLEVEL_OUTOF10: 8

## 2024-10-17 ASSESSMENT — ENCOUNTER SYMPTOMS
CONSTIPATION: 0
UNEXPECTED WEIGHT CHANGE: 0
DEPRESSION: 0
FEVER: 0
DIARRHEA: 0
DIAPHORESIS: 0
VOMITING: 0
CHILLS: 0
FATIGUE: 0
NAUSEA: 0

## 2024-10-17 NOTE — LETTER
October 17, 2024     Lindsey Etienne MD  [address]  [address]  [facility] [address]    Patient: Dani Young   YOB: 1960   Date of Visit: 10/17/2024       Dear Dr. Lindsey Etienne MD:    Thank you for referring Dani Young to me for evaluation. Below are my notes for this consultation.  If you have questions, please do not hesitate to call me. I look forward to following your patient along with you.       Sincerely,     Adolfo Galan MD      CC: No Recipients  ______________________________________________________________________________________    Subjective  Patient ID: Dani Young is a 64 y.o. male who presents for No chief complaint on file..  HPI  The patient states that he has had right sided groin pain for the past 2 weeks. It is worse with standing and activity, and better when he holds pressure on his groin and lays down. Denies constipation or any notable bulging. Was seen at primary care yesterday who suggested R inguinal hernia. US performed yesterday. Report is pending   Allergies: penicillins     Referred by Dr. Diaz, PCP.    Review of Systems   Constitutional:  Negative for chills, diaphoresis, fatigue, fever and unexpected weight change.   Gastrointestinal:  Negative for constipation, diarrhea, nausea and vomiting.     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 alcohol    He is a realtor     Past Medical History:   Diagnosis Date   • Anemia    • Arthritis    • BPH (benign prostatic hyperplasia)    • Disease of thyroid gland    • Hypocalcemia 08/10/2018    Hypocalcemia   • Hypothyroidism    • Localized enlarged lymph nodes 06/14/2016    Cervical lymphadenopathy   • Malignant melanoma of other part of trunk 05/10/2019    Malignant melanoma of back   • Nephrolithiasis    • Nontoxic multinodular goiter 04/18/2014    Nontoxic multinodular goiter   • Other conditions influencing health status     Nephrolithiasis   • Personal history of malignant neoplasm of thyroid 05/10/2019    History of malignant  neoplasm of thyroid   • Personal history of malignant neoplasm of thyroid 01/16/2017    History of malignant neoplasm of thyroid   • Personal history of other diseases of the digestive system 04/24/2014    History of inguinal hernia   • Personal history of other specified conditions 04/10/2017    History of urinary frequency   • PONV (postoperative nausea and vomiting)     PMH: hypothyroidism    Past Surgical History:   Procedure Laterality Date   • COLONOSCOPY     • HERNIA REPAIR     • JOINT REPLACEMENT     • LASIK     • OTHER SURGICAL HISTORY  05/10/2019    Thyroidectomy total   • OTHER SURGICAL HISTORY  05/10/2019    Inguinal hernia repair   • PROSTATE SURGERY     • SHOULDER SURGERY  05/10/2019    Shoulder Surgery   • TOTAL HIP ARTHROPLASTY  05/10/2019    Hip Replacement   • US GUIDED THYROID BIOPSY  09/10/2013    US GUIDED THYROID BIOPSY 9/10/2013 [facility]        Objective    Physical Exam  Constitutional:       General: He is not in acute distress.     Appearance: Normal appearance.   Abdominal:      General: Abdomen is flat. There is no distension.      Palpations: Abdomen is soft.      Tenderness: There is no guarding or rebound.      Hernia: A hernia is present. Hernia is present in the right inguinal area.   Neurological:      Mental Status: He is alert.   Psychiatric:         Mood and Affect: Mood normal.         Behavior: Behavior normal.     Small right inguinal hernia with cough and valsalva.  Tenderness that extents from external ring to right testicle.  No hernias noted on the left side.     Assessment/Plan  Diagnoses and all orders for this visit:  Unilateral inguinal hernia without obstruction or gangrene, recurrence not specified  -     Referral to General Surgery  -     Case Request Operating Room: Repair Inguinal Hernia Robot-Assisted; Standing  -     Request for Pre-Admission Testing Visit; Future  Other orders  -     Place in outpatient/hospital ambulatory surgery; Standing  -      Full code; Standing  -     NPO Diet Except: Sips with meds; Effective now; Standing  -     Height and weight; Standing  -     Insert and maintain peripheral IV; Standing  -     Saline lock IV; Standing  -     Type And Screen; Standing    Impression; patient with right groin pain with exertion, clinical exam shows small right wall hernia.  Recommend robotic right inguinal hernia repair with mesh.  We discussed the procedure to include risk, benefits alternatives.  He agrees to the operation

## 2024-10-17 NOTE — LETTER
October 17, 2024     Sania Diaz DO  [address]  [address] [address]    Patient: Dani Young   YOB: 1960   Date of Visit: 10/17/2024       Dear Dr. Sania Diaz DO:    Thank you for referring Dani Young to me for evaluation. Below are my notes for this consultation.  If you have questions, please do not hesitate to call me. I look forward to following your patient along with you.       Sincerely,     Adolfo Galan MD      CC: No Recipients  ______________________________________________________________________________________    Subjective  Patient ID: Dani Young is a 64 y.o. male who presents for No chief complaint on file..  HPI  The patient states that he has had right sided groin pain for the past 2 weeks. It is worse with standing and activity, and better when he holds pressure on his groin and lays down. Denies constipation or any notable bulging. Was seen at primary care yesterday who suggested R inguinal hernia. US performed yesterday. Report is pending   Allergies: penicillins     Referred by Dr. Diaz, PCP.    Review of Systems   Constitutional:  Negative for chills, diaphoresis, fatigue, fever and unexpected weight change.   Gastrointestinal:  Negative for constipation, diarrhea, nausea and vomiting.     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 alcohol    He is a realtor     Past Medical History:   Diagnosis Date   • Anemia    • Arthritis    • BPH (benign prostatic hyperplasia)    • Disease of thyroid gland    • Hypocalcemia 08/10/2018    Hypocalcemia   • Hypothyroidism    • Localized enlarged lymph nodes 06/14/2016    Cervical lymphadenopathy   • Malignant melanoma of other part of trunk 05/10/2019    Malignant melanoma of back   • Nephrolithiasis    • Nontoxic multinodular goiter 04/18/2014    Nontoxic multinodular goiter   • Other conditions influencing health status     Nephrolithiasis   • Personal history of malignant neoplasm of thyroid 05/10/2019    History of malignant  neoplasm of thyroid   • Personal history of malignant neoplasm of thyroid 01/16/2017    History of malignant neoplasm of thyroid   • Personal history of other diseases of the digestive system 04/24/2014    History of inguinal hernia   • Personal history of other specified conditions 04/10/2017    History of urinary frequency   • PONV (postoperative nausea and vomiting)     PMH: hypothyroidism    Past Surgical History:   Procedure Laterality Date   • COLONOSCOPY     • HERNIA REPAIR     • JOINT REPLACEMENT     • LASIK     • OTHER SURGICAL HISTORY  05/10/2019    Thyroidectomy total   • OTHER SURGICAL HISTORY  05/10/2019    Inguinal hernia repair   • PROSTATE SURGERY     • SHOULDER SURGERY  05/10/2019    Shoulder Surgery   • TOTAL HIP ARTHROPLASTY  05/10/2019    Hip Replacement   • US GUIDED THYROID BIOPSY  09/10/2013    US GUIDED THYROID BIOPSY 9/10/2013 [facility]        Objective    Physical Exam  Constitutional:       General: He is not in acute distress.     Appearance: Normal appearance.   Abdominal:      General: Abdomen is flat. There is no distension.      Palpations: Abdomen is soft.      Tenderness: There is no guarding or rebound.      Hernia: A hernia is present. Hernia is present in the right inguinal area.   Neurological:      Mental Status: He is alert.   Psychiatric:         Mood and Affect: Mood normal.         Behavior: Behavior normal.     Small right inguinal hernia with cough and valsalva.  Tenderness that extents from external ring to right testicle.  No hernias noted on the left side.     Assessment/Plan  Diagnoses and all orders for this visit:  Unilateral inguinal hernia without obstruction or gangrene, recurrence not specified  -     Referral to General Surgery  -     Case Request Operating Room: Repair Inguinal Hernia Robot-Assisted; Standing  -     Request for Pre-Admission Testing Visit; Future  Other orders  -     Place in outpatient/hospital ambulatory surgery; Standing  -      Full code; Standing  -     NPO Diet Except: Sips with meds; Effective now; Standing  -     Height and weight; Standing  -     Insert and maintain peripheral IV; Standing  -     Saline lock IV; Standing  -     Type And Screen; Standing    Impression; patient with right groin pain with exertion, clinical exam shows small right wall hernia.  Recommend robotic right inguinal hernia repair with mesh.  We discussed the procedure to include risk, benefits alternatives.  He agrees to the operation

## 2024-10-17 NOTE — PROGRESS NOTES
Subjective   Patient ID: Dani Young is a 64 y.o. male who presents for No chief complaint on file..  HPI  The patient states that he has had right sided groin pain for the past 2 weeks. It is worse with standing and activity, and better when he holds pressure on his groin and lays down. Denies constipation or any notable bulging. Was seen at primary care yesterday who suggested R inguinal hernia. US performed yesterday. Report is pending   Allergies: penicillins     Referred by Dr. Diaz, PCP.    Review of Systems   Constitutional:  Negative for chills, diaphoresis, fatigue, fever and unexpected weight change.   Gastrointestinal:  Negative for constipation, diarrhea, nausea and vomiting.     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 alcohol    He is a realtor     Past Medical History:   Diagnosis Date    Anemia     Arthritis     BPH (benign prostatic hyperplasia)     Disease of thyroid gland     Hypocalcemia 08/10/2018    Hypocalcemia    Hypothyroidism     Localized enlarged lymph nodes 06/14/2016    Cervical lymphadenopathy    Malignant melanoma of other part of trunk 05/10/2019    Malignant melanoma of back    Nephrolithiasis     Nontoxic multinodular goiter 04/18/2014    Nontoxic multinodular goiter    Other conditions influencing health status     Nephrolithiasis    Personal history of malignant neoplasm of thyroid 05/10/2019    History of malignant neoplasm of thyroid    Personal history of malignant neoplasm of thyroid 01/16/2017    History of malignant neoplasm of thyroid    Personal history of other diseases of the digestive system 04/24/2014    History of inguinal hernia    Personal history of other specified conditions 04/10/2017    History of urinary frequency    PONV (postoperative nausea and vomiting)     PMH: hypothyroidism    Past Surgical History:   Procedure Laterality Date    COLONOSCOPY      HERNIA REPAIR      JOINT REPLACEMENT      LASIK      OTHER SURGICAL HISTORY  05/10/2019    Thyroidectomy total    OTHER  SURGICAL HISTORY  05/10/2019    Inguinal hernia repair    PROSTATE SURGERY      SHOULDER SURGERY  05/10/2019    Shoulder Surgery    TOTAL HIP ARTHROPLASTY  05/10/2019    Hip Replacement    US GUIDED THYROID BIOPSY  09/10/2013    US GUIDED THYROID BIOPSY 9/10/2013 [facility]        Objective     Physical Exam  Constitutional:       General: He is not in acute distress.     Appearance: Normal appearance.   Abdominal:      General: Abdomen is flat. There is no distension.      Palpations: Abdomen is soft.      Tenderness: There is no guarding or rebound.      Hernia: A hernia is present. Hernia is present in the right inguinal area.   Neurological:      Mental Status: He is alert.   Psychiatric:         Mood and Affect: Mood normal.         Behavior: Behavior normal.     Small right inguinal hernia with cough and valsalva.  Tenderness that extents from external ring to right testicle.  No hernias noted on the left side.     Assessment/Plan   Diagnoses and all orders for this visit:  Unilateral inguinal hernia without obstruction or gangrene, recurrence not specified  -     Referral to General Surgery  -     Case Request Operating Room: Repair Inguinal Hernia Robot-Assisted; Standing  -     Request for Pre-Admission Testing Visit; Future  Other orders  -     Place in outpatient/hospital ambulatory surgery; Standing  -     Full code; Standing  -     NPO Diet Except: Sips with meds; Effective now; Standing  -     Height and weight; Standing  -     Insert and maintain peripheral IV; Standing  -     Saline lock IV; Standing  -     Type And Screen; Standing    Impression; patient with right groin pain with exertion, clinical exam shows small right wall hernia.  Recommend robotic right inguinal hernia repair with mesh.  We discussed the procedure to include risk, benefits alternatives.  He agrees to the operation

## 2024-10-22 ENCOUNTER — APPOINTMENT (OUTPATIENT)
Dept: PRIMARY CARE | Facility: CLINIC | Age: 64
End: 2024-10-22
Payer: COMMERCIAL

## 2024-10-24 ENCOUNTER — TELEPHONE (OUTPATIENT)
Dept: PRIMARY CARE | Facility: CLINIC | Age: 64
End: 2024-10-24
Payer: COMMERCIAL

## 2024-10-25 ENCOUNTER — APPOINTMENT (OUTPATIENT)
Dept: SURGERY | Facility: CLINIC | Age: 64
End: 2024-10-25
Payer: COMMERCIAL

## 2024-11-12 ENCOUNTER — OFFICE VISIT (OUTPATIENT)
Dept: ORTHOPEDIC SURGERY | Facility: CLINIC | Age: 64
End: 2024-11-12
Payer: COMMERCIAL

## 2024-11-12 ENCOUNTER — HOSPITAL ENCOUNTER (OUTPATIENT)
Dept: RADIOLOGY | Facility: CLINIC | Age: 64
Discharge: HOME | End: 2024-11-12
Payer: COMMERCIAL

## 2024-11-12 DIAGNOSIS — M25.551 CHRONIC HIP PAIN AFTER TOTAL REPLACEMENT OF RIGHT HIP JOINT: Primary | ICD-10-CM

## 2024-11-12 DIAGNOSIS — Z96.641 CHRONIC HIP PAIN AFTER TOTAL REPLACEMENT OF RIGHT HIP JOINT: Primary | ICD-10-CM

## 2024-11-12 DIAGNOSIS — G89.29 CHRONIC HIP PAIN AFTER TOTAL REPLACEMENT OF RIGHT HIP JOINT: Primary | ICD-10-CM

## 2024-11-12 DIAGNOSIS — M16.11 PRIMARY LOCALIZED OSTEOARTHRITIS OF RIGHT HIP: ICD-10-CM

## 2024-11-12 DIAGNOSIS — M25.551 GREATER TROCHANTERIC PAIN SYNDROME OF RIGHT LOWER EXTREMITY: ICD-10-CM

## 2024-11-12 PROCEDURE — 99214 OFFICE O/P EST MOD 30 MIN: CPT | Performed by: STUDENT IN AN ORGANIZED HEALTH CARE EDUCATION/TRAINING PROGRAM

## 2024-11-12 PROCEDURE — 1036F TOBACCO NON-USER: CPT | Performed by: STUDENT IN AN ORGANIZED HEALTH CARE EDUCATION/TRAINING PROGRAM

## 2024-11-12 PROCEDURE — 73502 X-RAY EXAM HIP UNI 2-3 VIEWS: CPT | Mod: RIGHT SIDE | Performed by: RADIOLOGY

## 2024-11-12 PROCEDURE — 73502 X-RAY EXAM HIP UNI 2-3 VIEWS: CPT | Mod: RT

## 2024-11-12 NOTE — PROGRESS NOTES
PRIMARY CARE PHYSICIAN: Lindsey Etienne MD  REFERRING PROVIDER: No referring provider defined for this encounter.       SUBJECTIVE  CHIEF COMPLAINT:   Chief Complaint   Patient presents with    Right Hip - New Patient Visit, Pain        HPI: Dani Young is a pleasant 64 y.o. year-old male who is seen today for evaluation of right hip pain.  Left hip was replaced in 2007 and the right hip was replaced in 2017, both by Dr. Robert Kepley.  He has felt like since surgery the right hip was never quite as good as the left.  However over the past 3 to 4 weeks, he has had severe lateral-based right hip pain that goes down to his knee.  It does not go past the knee.  No association with traumatic injury.  He does have a history of polymyalgia rheumatica.  He has had spine issues in the past as well.  The patient denies any numbness or tingling of the right lower extremity.    FUNCTIONAL STATUS: not limited.  AMBULATORY STATUS: Independent community ambulation without devices  PREVIOUS TREATMENTS: activity modification and over the counter medications   HISTORY OF SURGERY ON AFFECTED HIP(S): Yes   BACK PAIN REPORTED: Yes   SYMPTOMS INTERFERING WITH SLEEP: Yes   INSTABILITY: No   IMPACTING QUALITY OF LIFE: Yes     REVIEW OF SYSTEMS  The patient denies any fever, chills, chest pain, shortness of breath or difficulty breathing.  Patient denies any numbness, tingling, or radicular symptoms.  Adult patient history sheet was filled out by the patient today in clinic and will be scanned into the EMR.  I personally reviewed this form which will be scanned into the EMR.  This includes Past Medical History, Past Surgical History, Medications, Allergies, Social History, Family History and 12 point review of systems.    Past Medical History:   Diagnosis Date    Anemia     Arthritis     BPH (benign prostatic hyperplasia)     Disease of thyroid gland     Hypocalcemia 08/10/2018    Hypocalcemia    Hypothyroidism     Localized enlarged lymph  nodes 2016    Cervical lymphadenopathy    Malignant melanoma of other part of trunk 05/10/2019    Malignant melanoma of back    Nephrolithiasis     Nontoxic multinodular goiter 2014    Nontoxic multinodular goiter    Other conditions influencing health status     Nephrolithiasis    Personal history of malignant neoplasm of thyroid 05/10/2019    History of malignant neoplasm of thyroid    Personal history of malignant neoplasm of thyroid 2017    History of malignant neoplasm of thyroid    Personal history of other diseases of the digestive system 2014    History of inguinal hernia    Personal history of other specified conditions 04/10/2017    History of urinary frequency    PONV (postoperative nausea and vomiting)         Allergies   Allergen Reactions    Penicillins Rash and Unknown        Past Surgical History:   Procedure Laterality Date    COLONOSCOPY      HERNIA REPAIR      JOINT REPLACEMENT      LASIK      OTHER SURGICAL HISTORY  05/10/2019    Thyroidectomy total    OTHER SURGICAL HISTORY  05/10/2019    Inguinal hernia repair    PROSTATE SURGERY      SHOULDER SURGERY  05/10/2019    Shoulder Surgery    TOTAL HIP ARTHROPLASTY  05/10/2019    Hip Replacement    US GUIDED THYROID BIOPSY  09/10/2013    US GUIDED THYROID BIOPSY 9/10/2013 [facility]        Family History   Problem Relation Name Age of Onset    Stroke Mother OLYA     Diabetes Mother OLYA     Heart attack Mother OLYA         ACUTE MYOCARDIAL INFARCTION INVOLVING OTHER CORONARY ARTERY    Heart attack Father EDNA          AT AGE 62    Heart disease Father EDNA         Social History     Socioeconomic History    Marital status:      Spouse name: Not on file    Number of children: Not on file    Years of education: Not on file    Highest education level: Not on file   Occupational History    Not on file   Tobacco Use    Smoking status: Never    Smokeless tobacco: Never   Vaping Use    Vaping  status: Never Used   Substance and Sexual Activity    Alcohol use: Never    Drug use: Never    Sexual activity: Yes     Partners: Female     Birth control/protection: Female Sterilization   Other Topics Concern    Not on file   Social History Narrative    Not on file     Social Drivers of Health     Financial Resource Strain: Not on file   Food Insecurity: Not on file   Transportation Needs: Not on file   Physical Activity: Not on file   Stress: Not on file   Social Connections: Not on file   Intimate Partner Violence: Not on file   Housing Stability: Not on file        CURRENT MEDICATIONS:   Current Outpatient Medications   Medication Sig Dispense Refill    hydrocortisone 2.5 % cream Apply 1 Application topically if needed for irritation.      levothyroxine (Synthroid, Levoxyl) 175 mcg tablet Take 1 tablet (175 mcg) by mouth once daily. 90 tablet 3    tadalafil (Cialis) 10 mg tablet Take 1 tablet (10 mg) by mouth once daily as needed for erectile dysfunction. 30 tablet 6    triamcinolone (Kenalog) 0.1 % cream Apply 1 Application topically once daily as needed.       No current facility-administered medications for this visit.        OBJECTIVE    PHYSICAL EXAM  There is no height or weight on file to calculate BMI.    General: Well-appearing male in no acute distress.  Awake, alert and oriented.  Pleasant and cooperative.  Respiratory: Non-labored breathing  Mood: Euthymic   Gait: Normal  Assistive Device: None     Limb Length Discrepancy: None    Affected Right Hip  Range of motion:   Flexion: 110  Extension: 0  Internal Rotation: 15  External Rotation: 30  Abduction: 30  Hip Flexor Strength: 5/5  Abductor Strength: 5/5  Adductor Strength: 5/5  Tenderness: Mildly tender palpation about the greater trochanter  Sensation: Intact to light touch distally  Motor function: Able to fire TA, EHL, G/S  Pulses: Palpable DP pulse    Unaffected Left Hip  Range of motion:   Flexion: 120  Extension: 0  Internal Rotation:  20  External Rotation: 40  Abduction: 35  Hip Flexor Strength: 5/5  Abductor Strength: 5/5  Adductor Strength: 5/5  Tenderness: None  Sensation: Intact to light touch distally  Motor function: Able to fire TA, EHL, G/S    IMAGING:  AP pelvis, AP hip and false profile views: Independent review of right hip and pelvis x-rays was performed. The findings were reviewed with the patient.  There are bilateral total hip replacements.  The right total hip replacement has increased offset.  The inclination of the bilateral cups appears appropriate.  There is no evidence of loosening, dislocation, or fracture.    ASSESSMENT & PLAN    IMPRESSION:  Dani Young is a 64 y.o. male with right-sided trochanteric bursitis in the setting of increased femoral offset status post total hip placement    PLAN:  We discussed that I believe that Mr. Young's symptoms are coming from his high offset.  However, given that he has a [employer] Accolade implants, I would like to rule out metallosis as a cause of his pain.  Will obtain metal ion levels as well as an ESR and CRP.  If his metal levels were to be elevated, we would obtain an MRI.  I also provided him with a exercise sheet for trochanteric bursitis.  I will follow-up with him via phone after his lab results and I will see him back in the office in 6 weeks.  He will try the home exercise first and we may also consider a course of formal physical therapy.

## 2024-11-13 ENCOUNTER — LAB (OUTPATIENT)
Dept: LAB | Facility: LAB | Age: 64
End: 2024-11-13
Payer: COMMERCIAL

## 2024-11-13 DIAGNOSIS — M25.551 GREATER TROCHANTERIC PAIN SYNDROME OF RIGHT LOWER EXTREMITY: ICD-10-CM

## 2024-11-13 LAB
BASOPHILS # BLD AUTO: 0.01 X10*3/UL (ref 0–0.1)
BASOPHILS NFR BLD AUTO: 0.1 %
CRP SERPL-MCNC: <0.1 MG/DL
EOSINOPHIL # BLD AUTO: 0.01 X10*3/UL (ref 0–0.7)
EOSINOPHIL NFR BLD AUTO: 0.1 %
ERYTHROCYTE [DISTWIDTH] IN BLOOD BY AUTOMATED COUNT: 19.2 % (ref 11.5–14.5)
ERYTHROCYTE [SEDIMENTATION RATE] IN BLOOD BY WESTERGREN METHOD: <1 MM/H (ref 0–20)
HCT VFR BLD AUTO: 43.7 % (ref 41–52)
HGB BLD-MCNC: 13.2 G/DL (ref 13.5–17.5)
IMM GRANULOCYTES # BLD AUTO: 0.11 X10*3/UL (ref 0–0.7)
IMM GRANULOCYTES NFR BLD AUTO: 1.5 % (ref 0–0.9)
LYMPHOCYTES # BLD AUTO: 0.69 X10*3/UL (ref 1.2–4.8)
LYMPHOCYTES NFR BLD AUTO: 9.3 %
MCH RBC QN AUTO: 19.2 PG (ref 26–34)
MCHC RBC AUTO-ENTMCNC: 30.2 G/DL (ref 32–36)
MCV RBC AUTO: 63 FL (ref 80–100)
MONOCYTES # BLD AUTO: 0.23 X10*3/UL (ref 0.1–1)
MONOCYTES NFR BLD AUTO: 3.1 %
NEUTROPHILS # BLD AUTO: 6.34 X10*3/UL (ref 1.2–7.7)
NEUTROPHILS NFR BLD AUTO: 85.9 %
NRBC BLD-RTO: 0 /100 WBCS (ref 0–0)
PLATELET # BLD AUTO: 273 X10*3/UL (ref 150–450)
RBC # BLD AUTO: 6.89 X10*6/UL (ref 4.5–5.9)
WBC # BLD AUTO: 7.4 X10*3/UL (ref 4.4–11.3)

## 2024-11-13 PROCEDURE — 83018 HEAVY METAL QUAN EACH NES: CPT

## 2024-11-13 PROCEDURE — 82495 ASSAY OF CHROMIUM: CPT

## 2024-11-13 PROCEDURE — 85652 RBC SED RATE AUTOMATED: CPT

## 2024-11-13 PROCEDURE — 85025 COMPLETE CBC W/AUTO DIFF WBC: CPT

## 2024-11-13 PROCEDURE — 86140 C-REACTIVE PROTEIN: CPT

## 2024-11-13 PROCEDURE — 36415 COLL VENOUS BLD VENIPUNCTURE: CPT

## 2024-11-15 LAB
COBALT SERPL-MCNC: <1 UG/L
CR SERPL-MCNC: 1.2 UG/L

## 2024-12-06 ENCOUNTER — APPOINTMENT (OUTPATIENT)
Dept: ENDOCRINOLOGY | Facility: CLINIC | Age: 64
End: 2024-12-06
Payer: COMMERCIAL

## 2024-12-06 ENCOUNTER — LAB (OUTPATIENT)
Dept: LAB | Facility: LAB | Age: 64
End: 2024-12-06
Payer: COMMERCIAL

## 2024-12-06 ENCOUNTER — TELEPHONE (OUTPATIENT)
Dept: PRIMARY CARE | Facility: CLINIC | Age: 64
End: 2024-12-06

## 2024-12-06 VITALS
SYSTOLIC BLOOD PRESSURE: 132 MMHG | BODY MASS INDEX: 27.76 KG/M2 | HEIGHT: 66 IN | RESPIRATION RATE: 16 BRPM | HEART RATE: 105 BPM | DIASTOLIC BLOOD PRESSURE: 74 MMHG

## 2024-12-06 DIAGNOSIS — C73 THYROID CANCER (MULTI): Primary | ICD-10-CM

## 2024-12-06 DIAGNOSIS — C73 THYROID CANCER (MULTI): ICD-10-CM

## 2024-12-06 DIAGNOSIS — E03.9 HYPOTHYROIDISM, UNSPECIFIED TYPE: ICD-10-CM

## 2024-12-06 DIAGNOSIS — E89.0 POSTOPERATIVE HYPOTHYROIDISM: ICD-10-CM

## 2024-12-06 LAB
T4 FREE SERPL-MCNC: 1.73 NG/DL (ref 0.78–1.48)
TSH SERPL-ACNC: 1.2 MIU/L (ref 0.44–3.98)

## 2024-12-06 PROCEDURE — 84432 ASSAY OF THYROGLOBULIN: CPT

## 2024-12-06 PROCEDURE — 99213 OFFICE O/P EST LOW 20 MIN: CPT | Performed by: INTERNAL MEDICINE

## 2024-12-06 PROCEDURE — 84439 ASSAY OF FREE THYROXINE: CPT

## 2024-12-06 PROCEDURE — 86800 THYROGLOBULIN ANTIBODY: CPT

## 2024-12-06 PROCEDURE — 36415 COLL VENOUS BLD VENIPUNCTURE: CPT

## 2024-12-06 PROCEDURE — 84443 ASSAY THYROID STIM HORMONE: CPT

## 2024-12-06 PROCEDURE — 1036F TOBACCO NON-USER: CPT | Performed by: INTERNAL MEDICINE

## 2024-12-06 RX ORDER — LEVOTHYROXINE SODIUM 175 UG/1
175 TABLET ORAL DAILY
Qty: 90 TABLET | Refills: 3 | Status: SHIPPED | OUTPATIENT
Start: 2024-12-06

## 2024-12-06 ASSESSMENT — ENCOUNTER SYMPTOMS
CHILLS: 0
SHORTNESS OF BREATH: 0
DIZZINESS: 0
VOMITING: 0
NAUSEA: 0
FEVER: 0
LIGHT-HEADEDNESS: 0
DIARRHEA: 0

## 2024-12-06 NOTE — ASSESSMENT & PLAN NOTE
Due for tg, no need for us as long as tg negative  Orders:    Thyroid Stimulating Hormone; Future    Thyroxine, Free; Future    Thyroglobulin and Antithyroglobulin; Future

## 2024-12-06 NOTE — PROGRESS NOTES
Endocrinology: Follow up visit  Subjective   Patient ID: Dani Young is a 64 y.o. male who presents for Hypothyroidism and Thyroid Cancer.    PCP: Lindsey Etienne MD    HPI  Since last visit dealing with mskl issues and is scheduled for upcoming inguinal hernia surgery.  Taking t4 as directed.   Feels fine beyond hernia and hip bothering him.    10/13 thyroidectomy: multifocal ptc with 2/6+ln's  10/13 henry 100 mci  6/14 negative wbs  5/15 positive wbs and +u/s....150 mci HENRY  6/16 u/s +thyroid bed lesions.....ct negative  2/17 WBS negative tg stimulated 2.4  3/18 THW tg stimulated to 1.5, u/s with 2 ? small lymph nodes  12/18 u/s stable  2019 u/s: ? abnormal ln's...advised 6 months recheck, he forgot to repeat  2020 u/s stable from previous   Review of Systems   Constitutional:  Negative for chills and fever.   Respiratory:  Negative for shortness of breath.    Gastrointestinal:  Negative for diarrhea, nausea and vomiting.   Endocrine: Negative for cold intolerance and heat intolerance.   Neurological:  Negative for dizziness and light-headedness.       Patient Active Problem List   Diagnosis    Angioma    Personal history of malignant melanoma of skin    Melanoma in situ of skin of trunk (Multi)    Melanocytic nevi of trunk    Liver nodule    Intermittent gross hematuria    Hypothyroidism    High prostate specific antigen (PSA)    Hematuria    Elevated coronary artery calcium score    Spondylosis without myelopathy or radiculopathy, cervical region    DDD (degenerative disc disease), cervical    Cervical radiculopathy    Bilateral carpal tunnel syndrome    BPH with obstruction/lower urinary tract symptoms    Benign localized hyperplasia of prostate    Anemia    Benign neoplasm of upper extremity    Scar    Proteinuria    Thyroid cancer (Multi)    Squamous cell carcinoma of skin of face    Benign neoplasm of trunk    Benign neoplasm of lower extremity    Melanocytic nevi of unspecified upper limb, including shoulder     Eczema    Condition not found    Carpal tunnel syndrome    Malignant melanoma of back (Multi)    Unilateral inguinal hernia without obstruction or gangrene        Home Meds:  Current Outpatient Medications   Medication Instructions    dupilumab (DUPIXENT PEN SUBQ) Inject under the skin.    hydrocortisone 2.5 % cream 1 Application, As needed    levothyroxine (SYNTHROID, LEVOXYL) 175 mcg, oral, Daily    tadalafil (CIALIS) 10 mg, oral, Daily PRN    triamcinolone (Kenalog) 0.1 % cream 1 Application, Daily PRN        Allergies   Allergen Reactions    Penicillins Rash and Unknown        Objective   Vitals:    12/06/24 1005   BP: 132/74   Pulse: 105   Resp: 16      Vitals:      Body mass index is 27.76 kg/m².   Physical Exam  Constitutional:       Appearance: Normal appearance. He is overweight.   HENT:      Head: Normocephalic and atraumatic.   Neck:      Thyroid: No thyroid mass.      Comments: No palpable thyroid gland  Cardiovascular:      Rate and Rhythm: Normal rate and regular rhythm.      Heart sounds: No murmur heard.     No gallop.   Pulmonary:      Effort: Pulmonary effort is normal.      Breath sounds: Normal breath sounds.   Abdominal:      Palpations: Abdomen is soft.      Comments: benign   Neurological:      General: No focal deficit present.      Mental Status: He is alert and oriented to person, place, and time.      Deep Tendon Reflexes: Reflexes are normal and symmetric.   Psychiatric:         Behavior: Behavior is cooperative.         Labs:  Lab Results   Component Value Date    HGBA1C 5.4 05/08/2024    TSH 2.09 12/07/2023    FREET4 1.33 12/07/2023      Lab Results   Component Value Date    FELECIA AVILA 06/04/2019        Assessment/Plan   Assessment & Plan  Thyroid cancer (Multi)  Due for tg, no need for us as long as tg negative  Orders:    Thyroid Stimulating Hormone; Future    Thyroxine, Free; Future    Thyroglobulin and Antithyroglobulin; Future    Postoperative hypothyroidism    Due for tsh,  taking t4 separately from mvi since last time      Electronically signed by:  Nicolette De La Garza MD 12/06/24 10:33 AM

## 2024-12-09 LAB
THYROGLOB AB SERPL-ACNC: <1 IU/ML (ref 0–0.9)
THYROGLOB SERPL-MCNC: 0.2 NG/ML (ref 1.4–29.2)

## 2024-12-10 ENCOUNTER — APPOINTMENT (OUTPATIENT)
Dept: ORTHOPEDIC SURGERY | Facility: CLINIC | Age: 64
End: 2024-12-10
Payer: COMMERCIAL

## 2024-12-13 ENCOUNTER — CLINICAL SUPPORT (OUTPATIENT)
Dept: PREADMISSION TESTING | Facility: HOSPITAL | Age: 64
End: 2024-12-13
Payer: COMMERCIAL

## 2024-12-13 DIAGNOSIS — K40.90 UNILATERAL INGUINAL HERNIA WITHOUT OBSTRUCTION OR GANGRENE, RECURRENCE NOT SPECIFIED: ICD-10-CM

## 2024-12-13 NOTE — CPM/PAT NURSE NOTE
CPM/PAT Nurse Note      Name: Dani Young (Dani Young)  /Age: 1960/64 y.o.       Past Medical History:   Diagnosis Date    Anemia     .24: H/H 13.2/43.7    Arthritis     BPH (benign prostatic hyperplasia)     s/p TURP x 2, HoLEP    DDD (degenerative disc disease), cervical     Eczema     Elevated coronary artery calcium score     2019 - CT ca score 90.79    Hypothyroidism     s/p total thyroidectomy - cancer    Inguinal hernia     Liver nodule     Malignant melanoma of other part of trunk 05/10/2019    Malignant melanoma of back    Polymyalgia rheumatica (Multi)     remission    PONV (postoperative nausea and vomiting)     Pulmonary nodules     noted on CT ca score     SCCA (squamous cell carcinoma) of skin     Thyroid cancer (Multi)     s/p total thyroidectomy + LN (2)       Past Surgical History:   Procedure Laterality Date    COLONOSCOPY      HERNIA REPAIR      umbilical    LASIK      PROSTATE SURGERY      HoLEP    SHOULDER ARTHROSCOPY Left 1980    THYROIDECTOMY      Thyroidectomy total    TOTAL HIP ARTHROPLASTY Bilateral     TRANSURETHRAL RESECTION OF PROSTATE      x 2    US GUIDED THYROID BIOPSY  09/10/2013    US GUIDED THYROID BIOPSY 9/10/2013 [facility] ANCILLARY LEGACY       Patient  reports being sexually active and has had partner(s) who are female. He reports using the following method of birth control/protection: Female Sterilization.    Family History   Problem Relation Name Age of Onset    Stroke Mother OLYA     Diabetes Mother OLYA     Heart attack Mother OLYA         ACUTE MYOCARDIAL INFARCTION INVOLVING OTHER CORONARY ARTERY    Heart attack Father EDNA          AT AGE 62    Heart disease Father EDNA        Allergies   Allergen Reactions    Penicillins Rash       Prior to Admission medications    Medication Sig Start Date End Date Taking? Authorizing Provider   dupilumab (DUPIXENT PEN SUBQ) Inject 300 mg under the skin every 14 (fourteen) days.    Historical  Provider, MD   hydrocortisone 2.5 % cream Apply 1 Application topically if needed for irritation. 10/16/22   Historical Provider, MD   levothyroxine (Synthroid, Levoxyl) 175 mcg tablet Take 1 tablet (175 mcg) by mouth once daily. 12/6/24   Nicolette De La Garza MD   tadalafil (Cialis) 10 mg tablet Take 1 tablet (10 mg) by mouth once daily as needed for erectile dysfunction. 4/9/24 4/9/25  Erum Magana MD   triamcinolone (Kenalog) 0.1 % cream Apply 1 Application topically once daily as needed. 10/16/22   Historical Provider, MD JUANI COLE     DASI Risk Score      Flowsheet Row Clinical Support from 10/24/2023 in [facility]   Can you take care of yourself (eat, dress, bathe, or use toilet)?  2.75 filed at 10/24/2023 0749   Can you walk indoors, such as around your house? 1.75 filed at 10/24/2023 0749   Can you walk a block or two on level ground?  2.75 filed at 10/24/2023 0749   Can you climb a flight of stairs or walk up a hill? 5.5 filed at 10/24/2023 0749   Can you run a short distance? 8 filed at 10/24/2023 0749   Can you do light work around the house like dusting or washing dishes? 2.7 filed at 10/24/2023 0749   Can you do moderate work around the house like vacuuming, sweeping floors or carrying groceries? 3.5 filed at 10/24/2023 0749   Can you do heavy work around the house like scrubbing floors or lifting and moving heavy furniture?  0 filed at 10/24/2023 0749   Can you do yard work like raking leaves, weeding or pushing a mower? 4.5 filed at 10/24/2023 0749   Can you have sexual relations? 5.25 filed at 10/24/2023 0749   Can you participate in moderate recreational activities like golf, bowling, dancing, doubles tennis or throwing a baseball or football? 6 filed at 10/24/2023 0749   Can you participate in strenous sports like swimming, singles tennis, football, basketball, or skiing? 0 filed at 10/24/2023 0749   DASI SCORE 42.7 filed at 10/24/2023 0749   METS Score (Will be calculated only  when all the questions are answered) 8 filed at 10/24/2023 0749          Caprini DVT Assessment      Flowsheet Row Clinical Support from 10/24/2023 in [facility]   DVT Score 5 filed at 10/24/2023 0748   BMI 30 or less filed at 10/24/2023 0748   RETIRED: Current Status Major surgery planned, including arthroscopic and laproscopic (1-2 hours) filed at 10/24/2023 0748   RETIRED: Age 60-75 years filed at 10/24/2023 0748          Modified Frailty Index      Flowsheet Row Clinical Support from 10/24/2023 in [facility]   Non-independent functional status (problems with dressing, bathing, personal grooming, or cooking) 0 filed at 10/24/2023 0749   History of diabetes mellitus  0 filed at 10/24/2023 0749   History of COPD 0 filed at 10/24/2023 0749   History of CHF No filed at 10/24/2023 0749   History of MI 0 filed at 10/24/2023 0749   History of Percutaneous Coronary Intervention, Cardiac Surgery, or Angina No filed at 10/24/2023 0749   Hypertension requiring the use of medication  0 filed at 10/24/2023 0749   Peripheral vascular disease 0 filed at 10/24/2023 0749   Impaired sensorium (cognitive impairement or loss, clouding, or delirium) 0 filed at 10/24/2023 0749   TIA or CVA withouy residual deficit 0 filed at 10/24/2023 0749   Cerebrovascular accident with deficit 0 filed at 10/24/2023 0749   Modified Frailty Index Calculator 0 filed at 10/24/2023 0749          CHADS2 Stroke Risk  Current as of yesterday        N/A 3 to 100%: High Risk   2 to < 3%: Medium Risk   0 to < 2%: Low Risk     Last Change: N/A          This score determines the patient's risk of having a stroke if the patient has atrial fibrillation.        This score is not applicable to this patient. Components are not calculated.          Revised Cardiac Risk Index      Flowsheet Row Clinical Support from 10/24/2023 in [facility]   High-Risk Surgery (Intraperitoneal, Intrathoracic,Suprainguinal vascular) 0  filed at 10/24/2023 0749   History of ischemic heart disease (History of MI, History of positive exercuse test, Current chest paint considered due to myocardial ischemia, Use of nitrate therapy, ECG with pathological Q Waves) 0 filed at 10/24/2023 0749   History of congestive heart failure (pulmonary edemia, bilateral rales or S3 gallop, Paroxysmal nocturnal dyspnea, CXR showing pulmonary vascular redistribution) 0 filed at 10/24/2023 0749   History of cerebrovascular disease (Prior TIA or stroke) 0 filed at 10/24/2023 0749   Pre-operative insulin treatment 0 filed at 10/24/2023 0749   Pre-operative creatinine>2 mg/dl 0 filed at 10/24/2023 0749   Revised Cardiac Risk Calculator 0 filed at 10/24/2023 0749          Apfel Simplified Score    No data to display       Risk Analysis Index Results This Encounter    No data found in the last 10 encounters.       Stop Bang Score      Flowsheet Row Admission (Discharged) from 11/2/2023 in [facility] OR with Erum Magana MD Clinical Support from 10/24/2023 in [facility]   Do you snore loudly? 0 filed at 11/02/2023 0758 0 filed at 10/24/2023 0731   Do you often feel tired or fatigued after your sleep? 0 filed at 11/02/2023 0758 0 filed at 10/24/2023 0731   Has anyone ever observed you stop breathing in your sleep? 0 filed at 11/02/2023 0758 0 filed at 10/24/2023 0731   Do you have or are you being treated for high blood pressure? 0 filed at 11/02/2023 0758 0 filed at 10/24/2023 0731   Recent BMI (Calculated) 28.7 filed at 11/02/2023 0758 28.6 filed at 10/24/2023 0731   Is BMI greater than 35 kg/m2? 0=No filed at 11/02/2023 0758 0=No filed at 10/24/2023 0731   Age older than 50 years old? 1=Yes filed at 11/02/2023 0758 1=Yes filed at 10/24/2023 0731   Is your neck circumference greater than 17 inches (Male) or 16 inches (Female)? -- 0 filed at 10/24/2023 0731   Gender - Male 1=Yes filed at 11/02/2023 0758 --          Prodigy: High Risk  Total  Score: 16              Prodigy Age Score      Prodigy Gender Score          ARISCAT Score for Postoperative Pulmonary Complications    No data to display       Chana Perioperative Risk for Myocardial Infarction or Cardiac Arrest (HOLLY)    No data to display         Nurse Plan of Action:     RN screening call complete.  Reviewed allergies, medications and pharmacy, medical, surgical and social history with patient.  Chart updated.

## 2024-12-18 ENCOUNTER — DOCUMENTATION (OUTPATIENT)
Dept: PREADMISSION TESTING | Facility: HOSPITAL | Age: 64
End: 2024-12-18

## 2024-12-18 ENCOUNTER — LAB (OUTPATIENT)
Dept: LAB | Facility: LAB | Age: 64
End: 2024-12-18
Payer: COMMERCIAL

## 2024-12-18 ENCOUNTER — PRE-ADMISSION TESTING (OUTPATIENT)
Dept: PREADMISSION TESTING | Facility: HOSPITAL | Age: 64
End: 2024-12-18
Payer: COMMERCIAL

## 2024-12-18 VITALS
HEART RATE: 78 BPM | DIASTOLIC BLOOD PRESSURE: 78 MMHG | TEMPERATURE: 97 F | BODY MASS INDEX: 28.34 KG/M2 | RESPIRATION RATE: 16 BRPM | SYSTOLIC BLOOD PRESSURE: 140 MMHG | HEIGHT: 66 IN | OXYGEN SATURATION: 98 % | WEIGHT: 176.37 LBS

## 2024-12-18 DIAGNOSIS — E03.9 HYPOTHYROIDISM, UNSPECIFIED TYPE: ICD-10-CM

## 2024-12-18 DIAGNOSIS — E03.9 HYPOTHYROIDISM, UNSPECIFIED TYPE: Primary | ICD-10-CM

## 2024-12-18 LAB
ANION GAP SERPL CALC-SCNC: 9 MMOL/L (ref 10–20)
ATRIAL RATE: 80 BPM
BASOPHILS # BLD AUTO: 0.02 X10*3/UL (ref 0–0.1)
BASOPHILS NFR BLD AUTO: 0.3 %
BUN SERPL-MCNC: 17 MG/DL (ref 6–23)
CALCIUM SERPL-MCNC: 9.1 MG/DL (ref 8.6–10.3)
CHLORIDE SERPL-SCNC: 107 MMOL/L (ref 98–107)
CO2 SERPL-SCNC: 29 MMOL/L (ref 21–32)
CREAT SERPL-MCNC: 0.9 MG/DL (ref 0.5–1.3)
EGFRCR SERPLBLD CKD-EPI 2021: >90 ML/MIN/1.73M*2
EOSINOPHIL # BLD AUTO: 0.1 X10*3/UL (ref 0–0.7)
EOSINOPHIL NFR BLD AUTO: 1.7 %
ERYTHROCYTE [DISTWIDTH] IN BLOOD BY AUTOMATED COUNT: 19.1 % (ref 11.5–14.5)
GLUCOSE SERPL-MCNC: 98 MG/DL (ref 74–99)
HCT VFR BLD AUTO: 41.8 % (ref 41–52)
HGB BLD-MCNC: 12.9 G/DL (ref 13.5–17.5)
IMM GRANULOCYTES # BLD AUTO: 0.06 X10*3/UL (ref 0–0.7)
IMM GRANULOCYTES NFR BLD AUTO: 1 % (ref 0–0.9)
LYMPHOCYTES # BLD AUTO: 1.33 X10*3/UL (ref 1.2–4.8)
LYMPHOCYTES NFR BLD AUTO: 22.9 %
MCH RBC QN AUTO: 19.5 PG (ref 26–34)
MCHC RBC AUTO-ENTMCNC: 30.9 G/DL (ref 32–36)
MCV RBC AUTO: 63 FL (ref 80–100)
MONOCYTES # BLD AUTO: 0.51 X10*3/UL (ref 0.1–1)
MONOCYTES NFR BLD AUTO: 8.8 %
NEUTROPHILS # BLD AUTO: 3.78 X10*3/UL (ref 1.2–7.7)
NEUTROPHILS NFR BLD AUTO: 65.3 %
NRBC BLD-RTO: 0 /100 WBCS (ref 0–0)
P AXIS: 99 DEGREES
P OFFSET: 181 MS
P ONSET: 135 MS
PLATELET # BLD AUTO: 232 X10*3/UL (ref 150–450)
POTASSIUM SERPL-SCNC: 4.6 MMOL/L (ref 3.5–5.3)
PR INTERVAL: 152 MS
Q ONSET: 211 MS
QRS COUNT: 14 BEATS
QRS DURATION: 88 MS
QT INTERVAL: 366 MS
QTC CALCULATION(BAZETT): 422 MS
QTC FREDERICIA: 403 MS
R AXIS: -21 DEGREES
RBC # BLD AUTO: 6.62 X10*6/UL (ref 4.5–5.9)
SODIUM SERPL-SCNC: 140 MMOL/L (ref 136–145)
T AXIS: 32 DEGREES
T OFFSET: 394 MS
VENTRICULAR RATE: 80 BPM
WBC # BLD AUTO: 5.8 X10*3/UL (ref 4.4–11.3)

## 2024-12-18 PROCEDURE — 99204 OFFICE O/P NEW MOD 45 MIN: CPT | Performed by: NURSE PRACTITIONER

## 2024-12-18 PROCEDURE — 85025 COMPLETE CBC W/AUTO DIFF WBC: CPT

## 2024-12-18 PROCEDURE — 93010 ELECTROCARDIOGRAM REPORT: CPT | Performed by: INTERNAL MEDICINE

## 2024-12-18 PROCEDURE — 36415 COLL VENOUS BLD VENIPUNCTURE: CPT

## 2024-12-18 PROCEDURE — 80048 BASIC METABOLIC PNL TOTAL CA: CPT

## 2024-12-18 PROCEDURE — 93005 ELECTROCARDIOGRAM TRACING: CPT | Performed by: NURSE PRACTITIONER

## 2024-12-18 PROCEDURE — 87081 CULTURE SCREEN ONLY: CPT | Mod: AHULAB | Performed by: NURSE PRACTITIONER

## 2024-12-18 RX ORDER — CHLORHEXIDINE GLUCONATE ORAL RINSE 1.2 MG/ML
SOLUTION DENTAL
Qty: 473 ML | Refills: 0 | Status: SHIPPED | OUTPATIENT
Start: 2024-12-18

## 2024-12-18 ASSESSMENT — ENCOUNTER SYMPTOMS
ENDOCRINE NEGATIVE: 1
NECK NEGATIVE: 1
CARDIOVASCULAR NEGATIVE: 1
GASTROINTESTINAL NEGATIVE: 1
CONSTITUTIONAL NEGATIVE: 1
EYES NEGATIVE: 1
NEUROLOGICAL NEGATIVE: 1
ARTHRALGIAS: 1
RESPIRATORY NEGATIVE: 1

## 2024-12-18 NOTE — CPM/PAT NURSE NOTE
CPM/PAT Nurse Note      Name: Dani Young (Dani Young)  /Age: 1960/64 y.o.       Past Medical History:   Diagnosis Date    Anemia     .24: H/H 13.2/43.7    Arthritis     BPH (benign prostatic hyperplasia)     s/p TURP x 2, HoLEP    DDD (degenerative disc disease), cervical     Eczema     Elevated coronary artery calcium score     2019 - CT ca score 90.79    Hypothyroidism     s/p total thyroidectomy - cancer    Inguinal hernia     Liver nodule     Malignant melanoma of other part of trunk 05/10/2019    Malignant melanoma of back    Polymyalgia rheumatica (Multi)     remission    PONV (postoperative nausea and vomiting)     Pulmonary nodules     noted on CT ca score     SCCA (squamous cell carcinoma) of skin     Thyroid cancer (Multi)     s/p total thyroidectomy + LN (2)       Past Surgical History:   Procedure Laterality Date    COLONOSCOPY      HERNIA REPAIR      umbilical    LASIK      PROSTATE SURGERY      HoLEP    SHOULDER ARTHROSCOPY Left 1980    THYROIDECTOMY      Thyroidectomy total    TOTAL HIP ARTHROPLASTY Bilateral     TRANSURETHRAL RESECTION OF PROSTATE      x 2    US GUIDED THYROID BIOPSY  09/10/2013    US GUIDED THYROID BIOPSY 9/10/2013 [facility] ANCILLARY LEGACY       Patient  reports being sexually active and has had partner(s) who are female. He reports using the following method of birth control/protection: Female Sterilization.    Family History   Problem Relation Name Age of Onset    Stroke Mother OLYA     Diabetes Mother OLYA     Heart attack Mother OLYA         ACUTE MYOCARDIAL INFARCTION INVOLVING OTHER CORONARY ARTERY    Heart attack Father EDNA          AT AGE 62    Heart disease Father EDNA        Allergies   Allergen Reactions    Penicillins Rash       Prior to Admission medications    Medication Sig Start Date End Date Taking? Authorizing Provider   chlorhexidine (Peridex) 0.12 % solution SWISH AND SPIT 15ML FOR 30 SECONDS NIGHT PRIOR TO SURGERY AND  MORNING OF SURGERY 12/18/24   Rossi Rogers, APRN-CNP   dupilumab (DUPIXENT PEN SUBQ) Inject 300 mg under the skin every 14 (fourteen) days.    Historical Provider, MD   hydrocortisone 2.5 % cream Apply 1 Application topically if needed for irritation. 10/16/22   Historical Provider, MD   levothyroxine (Synthroid, Levoxyl) 175 mcg tablet Take 1 tablet (175 mcg) by mouth once daily. 12/6/24   Nicolette De La Garza MD   tadalafil (Cialis) 10 mg tablet Take 1 tablet (10 mg) by mouth once daily as needed for erectile dysfunction.  Patient not taking: Reported on 12/18/2024 4/9/24 4/9/25  Erum Magana MD   triamcinolone (Kenalog) 0.1 % cream Apply 1 Application topically once daily as needed. 10/16/22   Historical Provider, MD JUANI COLE     DASI Risk Score      Flowsheet Row Clinical Support from 10/24/2023 in [facility]   Can you take care of yourself (eat, dress, bathe, or use toilet)?  2.75 filed at 10/24/2023 0749   Can you walk indoors, such as around your house? 1.75 filed at 10/24/2023 0749   Can you walk a block or two on level ground?  2.75 filed at 10/24/2023 0749   Can you climb a flight of stairs or walk up a hill? 5.5 filed at 10/24/2023 0749   Can you run a short distance? 8 filed at 10/24/2023 0749   Can you do light work around the house like dusting or washing dishes? 2.7 filed at 10/24/2023 0749   Can you do moderate work around the house like vacuuming, sweeping floors or carrying groceries? 3.5 filed at 10/24/2023 0749   Can you do heavy work around the house like scrubbing floors or lifting and moving heavy furniture?  0 filed at 10/24/2023 0749   Can you do yard work like raking leaves, weeding or pushing a mower? 4.5 filed at 10/24/2023 0749   Can you have sexual relations? 5.25 filed at 10/24/2023 0749   Can you participate in moderate recreational activities like golf, bowling, dancing, doubles tennis or throwing a baseball or football? 6 filed at 10/24/2023 0749   Can you  participate in strenous sports like swimming, singles tennis, football, basketball, or skiing? 0 filed at 10/24/2023 0749   DASI SCORE 42.7 filed at 10/24/2023 0749   METS Score (Will be calculated only when all the questions are answered) 8 filed at 10/24/2023 0749          Capjohni DVT Assessment      Flowsheet Row Clinical Support from 10/24/2023 in [facility]   DVT Score 5 filed at 10/24/2023 0748   BMI 30 or less filed at 10/24/2023 0748   RETIRED: Current Status Major surgery planned, including arthroscopic and laproscopic (1-2 hours) filed at 10/24/2023 0748   RETIRED: Age 60-75 years filed at 10/24/2023 0748          Modified Frailty Index      Flowsheet Row Clinical Support from 10/24/2023 in [facility]   Non-independent functional status (problems with dressing, bathing, personal grooming, or cooking) 0 filed at 10/24/2023 0749   History of diabetes mellitus  0 filed at 10/24/2023 0749   History of COPD 0 filed at 10/24/2023 0749   History of CHF No filed at 10/24/2023 0749   History of MI 0 filed at 10/24/2023 0749   History of Percutaneous Coronary Intervention, Cardiac Surgery, or Angina No filed at 10/24/2023 0749   Hypertension requiring the use of medication  0 filed at 10/24/2023 0749   Peripheral vascular disease 0 filed at 10/24/2023 0749   Impaired sensorium (cognitive impairement or loss, clouding, or delirium) 0 filed at 10/24/2023 0749   TIA or CVA withouy residual deficit 0 filed at 10/24/2023 0749   Cerebrovascular accident with deficit 0 filed at 10/24/2023 0749   Modified Frailty Index Calculator 0 filed at 10/24/2023 0749          CHADS2 Stroke Risk  Current as of 2 days ago (Monday)        N/A 3 to 100%: High Risk   2 to < 3%: Medium Risk   0 to < 2%: Low Risk     Last Change: N/A          This score determines the patient's risk of having a stroke if the patient has atrial fibrillation.        This score is not applicable to this patient. Components are  not calculated.          Revised Cardiac Risk Index      Flowsheet Row Clinical Support from 10/24/2023 in [facility]   High-Risk Surgery (Intraperitoneal, Intrathoracic,Suprainguinal vascular) 0 filed at 10/24/2023 0749   History of ischemic heart disease (History of MI, History of positive exercuse test, Current chest paint considered due to myocardial ischemia, Use of nitrate therapy, ECG with pathological Q Waves) 0 filed at 10/24/2023 0749   History of congestive heart failure (pulmonary edemia, bilateral rales or S3 gallop, Paroxysmal nocturnal dyspnea, CXR showing pulmonary vascular redistribution) 0 filed at 10/24/2023 0749   History of cerebrovascular disease (Prior TIA or stroke) 0 filed at 10/24/2023 0749   Pre-operative insulin treatment 0 filed at 10/24/2023 0749   Pre-operative creatinine>2 mg/dl 0 filed at 10/24/2023 0749   Revised Cardiac Risk Calculator 0 filed at 10/24/2023 0749          Apfel Simplified Score    No data to display       Risk Analysis Index Results This Encounter    No data found in the last 10 encounters.       Stop Bang Score      Flowsheet Row Admission (Discharged) from 11/2/2023 in [facility] OR with Erum Magana MD Clinical Support from 10/24/2023 in [facility]   Do you snore loudly? 0 filed at 11/02/2023 0758 0 filed at 10/24/2023 0731   Do you often feel tired or fatigued after your sleep? 0 filed at 11/02/2023 0758 0 filed at 10/24/2023 0731   Has anyone ever observed you stop breathing in your sleep? 0 filed at 11/02/2023 0758 0 filed at 10/24/2023 0731   Do you have or are you being treated for high blood pressure? 0 filed at 11/02/2023 0758 0 filed at 10/24/2023 0731   Recent BMI (Calculated) 28.7 filed at 11/02/2023 0758 28.6 filed at 10/24/2023 0731   Is BMI greater than 35 kg/m2? 0=No filed at 11/02/2023 0758 0=No filed at 10/24/2023 0731   Age older than 50 years old? 1=Yes filed at 11/02/2023 0758 1=Yes filed at  10/24/2023 0731   Is your neck circumference greater than 17 inches (Male) or 16 inches (Female)? -- 0 filed at 10/24/2023 0731   Gender - Male 1=Yes filed at 11/02/2023 0758 --          Prodigy: High Risk  Total Score: 16              Prodigy Age Score      Prodigy Gender Score          ARISCAT Score for Postoperative Pulmonary Complications    No data to display       Zayas Perioperative Risk for Myocardial Infarction or Cardiac Arrest (HOLLY)    No data to display         Nurse Plan of Action:       After Visit Summary (AVS) reviewed and patient verbalized good understanding of medications and NPO instructions.  Pre-op infection prevention measures:  CHG showers and mouthwash reviewed, understanding voiced.  CHG soap given and patient verbalized need to pick CHG mouthwash at their preferred local pharmacy.

## 2024-12-18 NOTE — CPM/PAT H&P
[facility]/PAT Evaluation       Name: Dani Young (Dani Young)  /Age: 1960/64 y.o.     In-Person         Date of Consult: 24    Referring Provider:  Dr. Galan    Date, Surgery, and Length:  25, robot assisted right inguinal hernia repair with mesh placement, 150 minutes    Patient presents to [facility] for perioperative risk assessment prior to scheduled surgery. Patient presents with right inguinal hernia that is non-obstructive. Patient noticed a right sided inguinal bulge several months prior and it is worse with standing and activity.       This note was created in part upon personal review of patient's medical records.      Patient endorses PONV    Pt denies any past history of anesthetic complications such as awareness, prolonged sedation, dental damage, aspiration, cardiac arrest, difficult intubation, difficult I.V. access or unexpected hospital admissions. No history of malignant hyperthermia and or pseudocholinesterase deficiency.    No history of blood transfusions.    The patient IS NOT a Mu-ism and will accept blood and blood products if medically indicated.     Type and screen NOT sent.      Past Medical History:   Diagnosis Date    Anemia     .24: H/H 13.2/43.7    Arthritis     BPH (benign prostatic hyperplasia)     s/p TURP x 2, HoLEP    DDD (degenerative disc disease), cervical     Eczema     Elevated coronary artery calcium score     2019 - CT ca score 90.79    Hypothyroidism     s/p total thyroidectomy - cancer    Inguinal hernia     Liver nodule     Malignant melanoma of other part of trunk 05/10/2019    Malignant melanoma of back    Polymyalgia rheumatica (Multi)     remission    PONV (postoperative nausea and vomiting)     Pulmonary nodules     noted on CT ca score     SCCA (squamous cell carcinoma) of skin     Thyroid cancer (Multi)     s/p total thyroidectomy + LN (2)       Past Surgical History:   Procedure Laterality Date    COLONOSCOPY      HERNIA REPAIR       "umbilical    LASIK      PROSTATE SURGERY      HoLEP    SHOULDER ARTHROSCOPY Left 1980    THYROIDECTOMY  2013    Thyroidectomy total    TOTAL HIP ARTHROPLASTY Bilateral     TRANSURETHRAL RESECTION OF PROSTATE      x 2    US GUIDED THYROID BIOPSY  09/10/2013    US GUIDED THYROID BIOPSY 9/10/2013 [facility]       Family History   Problem Relation Name Age of Onset    Stroke Mother OLYA     Diabetes Mother OLYA     Heart attack Mother OLYA         ACUTE MYOCARDIAL INFARCTION INVOLVING OTHER CORONARY ARTERY    Heart attack Father EDNA          AT AGE 62    Heart disease Father EDNA      Social History     Tobacco Use   Smoking Status Never   Smokeless Tobacco Never     Social History     Substance and Sexual Activity   Alcohol Use Never     Social History     Substance and Sexual Activity   Drug Use Never       Allergies   Allergen Reactions    Penicillins Rash       Current Outpatient Medications   Medication Instructions    chlorhexidine (Peridex) 0.12 % solution SWISH AND SPIT 15ML FOR 30 SECONDS NIGHT PRIOR TO SURGERY AND MORNING OF SURGERY    dupilumab (DUPIXENT PEN SUBQ) 300 mg, Every 14 days    hydrocortisone 2.5 % cream 1 Application, As needed    levothyroxine (SYNTHROID, LEVOXYL) 175 mcg, oral, Daily    tadalafil (CIALIS) 10 mg, oral, Daily PRN    triamcinolone (Kenalog) 0.1 % cream 1 Application, Daily PRN       PAT ROS:   Constitutional:   neg    Neuro/Psych:   neg    Eyes:   neg    Ears:   neg    Nose:   neg    Mouth:   neg    Throat:   Neck:   neg    Cardio:   neg    Respiratory:   neg    Endocrine:   neg    GI:   neg    :   neg    Musculoskeletal:    arthralgias  Hematologic:   neg    Skin:      Physical Exam  Vitals reviewed. Physical exam within normal limits.          PAT AIRWAY:   Airway:     Mallampati::  II    Neck ROM::  Full  normal            Visit Vitals  /78   Pulse 78   Temp 36.1 °C (97 °F)   Resp 16   Ht 1.676 m (5' 5.98\")   Wt 80 kg (176 lb 5.9 oz) "   SpO2 98%   BMI 28.48 kg/m²   Smoking Status Never   BSA 1.93 m²       Assessment and Plan:     Patient is a 64 year old male scheduled for robot assisted right inguinal hernia repair with mesh placement on 1/2/25 with Dr. Galan.    Patient is at acceptable risk to proceed with planned surgical procedure. Further cardiac risk stratification deferred at this time.This patient is INTERMEDIATE risk candidate undergoing LOW risk procedure, patient is medically optimized for surgery.    Plan    Neuro:    No neurologic diagnosis, however, the patient is at increased risk for perioperative delirium secondary to  age  Patient is at increased risk for perioperative CVA secondary to  increased age    Cardiovascular:    RCRI: 0 Risk of Mace: 3.9%      Patient denies any chest pain, tightness, heaviness, pressure, radiating pain, palpitations, irregular heartbeats, lightheadedness, cough, congestion, shortness of breath, MUNOZ, PND, near syncope, weight loss or gain.    Good functional capacity  Functional 4 Mets. Patient denies SOB walking up 2 flights of stairs      EKG in [facility] 12/18/24  Encounter Date: 12/18/24   ECG 12 Lead   Result Value    Ventricular Rate 80    Atrial Rate 80    OH Interval 152    QRS Duration 88    QT Interval 366    QTC Calculation(Bazett) 422    P Axis 99    R Axis -21    T Axis 32    QRS Count 14    Q Onset 211    P Onset 135    P Offset 181    T Offset 394    QTC Fredericia 403    Narrative    Normal sinus rhythm  RSR' or QR pattern in V1 suggests right ventricular conduction delay  Borderline ECG  No previous ECGs available  Confirmed by Jian Martínez ([id number]) on 12/18/2024 2:46:20 PM       Pulmonary:    No pulmonary diagnosis, however patient is at increased risk of perioperative complications secondary to  age > 60  Stop Bang score is 2 placing patient at LOW risk for MOISES  ARISCAT: <26 points, 1.6% risk of in-hospital postoperative pulmonary complication  PRODIGY: High risk for opioid induced  respiratory depression    Renal/endo:  Recommendations to avoid nephrotoxic drugs and carefully monitor fluid status to maintain euvolemia. Use dose adjusted medications as needed for the underlying level of renal function.    Thyroid cancer s/p thyroidectomy- continue Levothyroxine      Heme:  Patient instructed to ambulate as soon as possible postoperatively to decrease thromboembolic risk.    Initiate mechanical DVT prophylaxis as soon as possible and initiate chemical prophylaxis when deemed safe from a bleeding standpoint post surgery.        Caprini= 4        Risk assessment complete.  Patient is scheduled for a LOW surgical risk procedure. He IS considered medically optimized for the planned procedure.        Labs/testing obtained in PAT on 12/18/24: EKG, MRSA, CBC, BMP    Lab Results   Component Value Date    WBC 5.8 12/18/2024    HGB 12.9 (L) 12/18/2024    HCT 41.8 12/18/2024    MCV 63 (L) 12/18/2024     12/18/2024     Lab Results   Component Value Date    GLUCOSE 98 12/18/2024    CALCIUM 9.1 12/18/2024     12/18/2024    K 4.6 12/18/2024    CO2 29 12/18/2024     12/18/2024    BUN 17 12/18/2024    CREATININE 0.90 12/18/2024         Follow up/communication: none      Preoperative medication instructions were provided and reviewed with the patient.  Any additional testing or evaluation was explained to the patient.  Nothing by mouth instructions were discussed and patient's questions were answered prior to conclusion to this encounter.  Patient verbalized understanding of preoperative instructions given in preadmission testing; discharge instructions available in EMR.    This note was dictated with speech recognition.  Minor errors may have been detected during use of speech recognition.

## 2024-12-18 NOTE — PREPROCEDURE INSTRUCTIONS
Medication List            Accurate as of December 18, 2024 10:20 AM. Always use your most recent med list.                chlorhexidine 0.12 % solution  Commonly known as: Peridex  SWISH AND SPIT 15ML FOR 30 SECONDS NIGHT PRIOR TO SURGERY AND MORNING OF SURGERY  Medication Adjustments for Surgery: Take/Use as prescribed     DUPIXENT PEN SUBQ  Additional Medication Adjustments for Surgery: Take last dose 7 days before surgery  Notes to patient: Do NOT take next dose on 1/1/25. May resume after surgery     hydrocortisone 2.5 % cream  Medication Adjustments for Surgery: Do Not take on the morning of surgery     levothyroxine 175 mcg tablet  Commonly known as: Synthroid, Levoxyl  Take 1 tablet (175 mcg) by mouth once daily.  Medication Adjustments for Surgery: Take/Use as prescribed     tadalafil 10 mg tablet  Commonly known as: Cialis  Take 1 tablet (10 mg) by mouth once daily as needed for erectile dysfunction.  Medication Adjustments for Surgery: Take last dose 3 days before surgery     triamcinolone 0.1 % cream  Commonly known as: Kenalog  Medication Adjustments for Surgery: Do Not take on the morning of surgery              Preoperative Deep Breathing Exercises  Why it is important to do deep breathing exercises before my surgery?  Deep breathing exercises strengthen your breathing muscles.  This helps you to recover after your surgery and decreases the chance of breathing complications.  How are the deep breathing exercises done?  Sit straight with your back supported.  Breathe in deeply and slowly through your nose. Your lower rib cage should expand and your abdomen may move forward.  Hold that breath for 3 to 5 seconds.  Breathe out through pursed lips, slowly and completely.  Rest and repeat 10 times every hour while awake.  Rest longer if you become dizzy or lightheaded.      CONTACT SURGEON'S OFFICE IF YOU DEVELOP:  * Fever = 100.4 F   * New respiratory symptoms (e.g. cough, shortness of breath,  respiratory distress, sore throat)  * Recent loss of taste or smell  *Flu like symptoms such as headache, fatigue or gastrointestinal symptoms  * You develop any open sores, shingles, burning or painful urination   AND/OR:  * You no longer wish to have the surgery.  * Any other personal circumstances change that may lead to the need to cancel or defer this surgery.  *You were admitted to any hospital within one week of your planned procedure.    SMOKING:  *Quitting smoking can make a huge difference to your health and recovery from surgery.    *If you need help with quitting, call 8-055-QUIT-NOW.    THE DAY OF SURGERY:  *Do not eat any food after midnight the night before your surgery.   *YOU MUST drink 14 OUNCES of clear liquids TWO hours before your instructed ARRIVAL TIME to the hospital. This includes water, black tea/coffee (no milk or cream), apple juice, clear broth and electrolyte drinks (Gatorade).  Please avoid clear liquids that are red in color.   *You may chew gum/mints up to TWO hours before your surgery/procedure.    SURGICAL TIME:  *You will be contacted between 2 p.m. and 6 p.m. the business day before your surgery with your arrival time.  *If you haven't received a call by 6pm, call [phone].  *Scheduled surgery times may change and you will be notified if this occurs-check your personal voicemail for any updates.    ON THE MORNING OF SURGERY:  *Wear comfortable, loose fitting clothing.   *Do not use moisturizers, creams, lotions or perfume.  *All jewelry and valuables should be left at home.  *Prosthetic devices such as contact lenses, hearing aids, dentures, eyelash extensions, hairpins and body piercing must be removed before surgery.    BRING WITH YOU:  *Photo ID and insurance card  *Current list of medications and allergies  *Pacemaker/Defibrillator/Heart stent cards  *CPAP machine and mask  *Slings/splints/crutches  *Copy of your complete Advanced Directive/DHPOA-if  applicable  *Neurostimulator implant remote    PARKING AND ARRIVAL:  *Check in at the Main Entrance desk and let them know you are here for surgery.  *You will be directed to the 2nd floor surgical waiting area.    IF YOU ARE HAVING OUTPATIENT/SAME DAY SURGERY:  *A responsible adult MUST accompany you at the time of discharge and stay with you for 24 hours after your surgery.  *You may NOT drive yourself home after surgery.  *You may use a taxi or ride sharing service ([employer], Uber) to return home ONLY if you are accompanied by a friend or family member.  *Instructions for resuming your medications will be provided by your surgeon.      Patient Information: Oral/Dental Rinse  **This is a prescription; pick it up at your preferred local pharmacy **  What is oral/dental rinse?   It is a mouthwash. It is a way of cleaning the mouth with a germ killing solution before your surgery.  The solution contains chlorhexidine, commonly known as CHG.   It is used inside the mouth to kill a bacteria known as Staphylococcus aureus.  Let your doctor know if you are allergic to Chlorhexidine.    Why do I need to use CHG oral/dental rinse?  The CHG oral/dental rinse helps to kill a bacteria in your mouth known a Staphylococcus aureus.     This reduces the risk of infection at the surgical site.      Using your CHG oral/dental rinse  STEPS:  Use your CHG oral/dental rinse after you brush your teeth the night before (at bedtime) and the morning of your surgery.  Follow all directions on your prescription label.    Use the cap on the container to measure 15ml (fill cap to fill line)  Swish (gargle if you can) the mouthwash in your mouth for at least 30 seconds, (do not to swallow) spit out  After you use your CHG rinse, do not rinse your mouth with water, drink or eat.  Please refer to prescription label for the appropriate time to resume oral intake  Dental rinse comes in one size bottle: 473ml ~16oz.  You will have leftover    rinse,  discard after this use.    What side effects might I have using the CHG oral/dental rinse?  CHG rinse will stick to plaque on the teeth.  Brush and floss just before use.  Teeth brushing will help avoid staining of plaque during use.    Who should I contact if I have questions about the CHG oral/dental rinse?  Please call [facility], Preadmission Testing at [phone] if you have any questions      Home Preoperative Antibacterial Shower     What is a home preoperative antibacterial shower?  This shower is a way of cleaning the skin with a germ killing soap before surgery.  The soap contains chlorhexidine, commonly known as CHG.  CHG is a soap for your skin with germ killing ability.  Let your doctor know if you are allergic to chlorhexidine.    Why do I need to take a preoperative antibacterial shower?  Skin is not sterile.  It is best to try to make your skin as free of germs as possible before surgery.  Proper cleansing with a germ killing soap before surgery can lower the number of germs on your skin.  This helps to reduce the risk of infection at the surgical site.  Following the instructions listed below will help you prepare your skin for surgery.      How do I use the CHG skin cleanser?  Steps:  Begin using your CHG soap five days before your scheduled surgery on ________________________.    Days 1-4 Shower before bed:  Wash your face and genitals with your normal soap and rinse.    Wash and rinse your hair using the CHG soap. Rinse completely, do not condition your   Hair.          3.    Apply the CHG soap to a clean wet washcloth.  Turn the water off or move away                From the water spray to avoid premature rinsing of the CHG soap as you are applying.     4.   Lather your entire body from the neck down.  Do not use on your face or genitals.   Pay special attention to the area(s) where your incision(s) will be located unless they are on your face.  Avoid scrubbing  your skin too hard.  The important point is to have the CHG soap sit on your skin for 3 minutes.    When the 3 minutes are up, turn on the water and rinse the CHG soap off your body completely.   Pat yourself dry with a clean, freshly-laundered towel.  Dress in clean, freshly laundered night clothes.    Be sure to sleep with clean, freshly laundered sheets.  Day 5:  Last shower is the morning before surgery: Follow above Instructions.    NOTE:    *Hair extensions should be removed    *Keep CHG soap out of eyes and ear canals   *DO NOT wash with regular soap on your body after you have used the CHG        soap solution  *DO NOT apply powders, lotions, or perfume.  *Deodorant may be used days 1-4, BUT NOT the day of surgery    Who should I contact if I have any questions regarding the use of CHG soap?  Call the [facility], Preadmission Testing at [phone] if you have any questions.              Patient Information: Pre-Operative Infection Prevention Measures     Why did I have my nose, under my arms and groin swabbed?  The purpose of the swab is to identify Staphylococcus aureus inside your nose or on your skin.  The swab was sent to the laboratory for culture.  A positive swab/culture for Staphylococcus aureus is called colonization or carriage.      What is Staphylococcus aureus?  Staphylococcus aureus, also known as “staph”, is a germ found on the skin or in the nose of healthy people.  Sometimes Staphylococcus aureus can get into the body and cause an infection.  This can be minor (such as pimples, boils or other skin problems).  It might also be serious (such as blood infection, pneumonia or a surgical site infection).    What is Staphylococcus aureus colonization or carriage?  Colonization or carriage means that a person has the germ but is not sick from it.  These bacteria can be spread on the hands or when breathing or sneezing.    How is Staphylococcus aureus spread?  It is  most often spread by close contact with a person or item that carries it.    What happens if my culture is positive for Staphylococcus aureus?  Your doctor/medical team will use this information to guide any antibiotic treatment which may be necessary.  Regardless of the culture results, we will clean the inside of your nose with a betadine swab just before you have your surgery.      Will I get an infection if I have Staphylococcus aureus in my nose or on my skin?  Anyone can get an infection with Staphylococcus aureus.  However, the best way to reduce your risk of infection is to follow the instructions provided to you for the use of your CHG soap and dental rinse.        Who should I contact if I have any questions?  Call the [facility], Preadmission Testing at [phone] if you have any questions.

## 2024-12-18 NOTE — H&P (VIEW-ONLY)
[facility]/PAT Evaluation       Name: Dani Young (Dani Young)  /Age: 1960/64 y.o.     In-Person         Date of Consult: 24    Referring Provider:  Dr. Galan    Date, Surgery, and Length:  25, robot assisted right inguinal hernia repair with mesh placement, 150 minutes    Patient presents to [facility] for perioperative risk assessment prior to scheduled surgery. Patient presents with right inguinal hernia that is non-obstructive. Patient noticed a right sided inguinal bulge several months prior and it is worse with standing and activity.       This note was created in part upon personal review of patient's medical records.      Patient endorses PONV    Pt denies any past history of anesthetic complications such as awareness, prolonged sedation, dental damage, aspiration, cardiac arrest, difficult intubation, difficult I.V. access or unexpected hospital admissions. No history of malignant hyperthermia and or pseudocholinesterase deficiency.    No history of blood transfusions.    The patient IS NOT a Faith and will accept blood and blood products if medically indicated.     Type and screen NOT sent.      Past Medical History:   Diagnosis Date    Anemia     .24: H/H 13.2/43.7    Arthritis     BPH (benign prostatic hyperplasia)     s/p TURP x 2, HoLEP    DDD (degenerative disc disease), cervical     Eczema     Elevated coronary artery calcium score     2019 - CT ca score 90.79    Hypothyroidism     s/p total thyroidectomy - cancer    Inguinal hernia     Liver nodule     Malignant melanoma of other part of trunk 05/10/2019    Malignant melanoma of back    Polymyalgia rheumatica (Multi)     remission    PONV (postoperative nausea and vomiting)     Pulmonary nodules     noted on CT ca score     SCCA (squamous cell carcinoma) of skin     Thyroid cancer (Multi)     s/p total thyroidectomy + LN (2)       Past Surgical History:   Procedure Laterality Date    COLONOSCOPY      HERNIA REPAIR       "umbilical    LASIK      PROSTATE SURGERY      HoLEP    SHOULDER ARTHROSCOPY Left 1980    THYROIDECTOMY  2013    Thyroidectomy total    TOTAL HIP ARTHROPLASTY Bilateral     TRANSURETHRAL RESECTION OF PROSTATE      x 2    US GUIDED THYROID BIOPSY  09/10/2013    US GUIDED THYROID BIOPSY 9/10/2013 [facility]CILL[facility] LEGAC[facility]       Family History   Problem Relation Name Age of Onset    Stroke Mother OLYA     Diabetes Mother OLYA     Heart attack Mother OLYA         ACUTE MYOCARDIAL INFARCTION INVOLVING OTHER CORONARY ARTERY    Heart attack Father EDNA          AT AGE 62    Heart disease Father EDNA      Social History     Tobacco Use   Smoking Status Never   Smokeless Tobacco Never     Social History     Substance and Sexual Activity   Alcohol Use Never     Social History     Substance and Sexual Activity   Drug Use Never       Allergies   Allergen Reactions    Penicillins Rash       Current Outpatient Medications   Medication Instructions    chlorhexidine (Peridex) 0.12 % solution SWISH AND SPIT 15ML FOR 30 SECONDS NIGHT PRIOR TO SURGERY AND MORNING OF SURGERY    dupilumab (DUPIXENT PEN SUBQ) 300 mg, Every 14 days    hydrocortisone 2.5 % cream 1 Application, As needed    levothyroxine (SYNTHROID, LEVOXYL) 175 mcg, oral, Daily    tadalafil (CIALIS) 10 mg, oral, Daily PRN    triamcinolone (Kenalog) 0.1 % cream 1 Application, Daily PRN       PAT ROS:   Constitutional:   neg    Neuro/Psych:   neg    Eyes:   neg    Ears:   neg    Nose:   neg    Mouth:   neg    Throat:   Neck:   neg    Cardio:   neg    Respiratory:   neg    Endocrine:   neg    GI:   neg    :   neg    Musculoskeletal:    arthralgias  Hematologic:   neg    Skin:      Physical Exam  Vitals reviewed. Physical exam within normal limits.          PAT AIRWAY:   Airway:     Mallampati::  II    Neck ROM::  Full  normal            Visit Vitals  /78   Pulse 78   Temp 36.1 °C (97 °F)   Resp 16   Ht 1.676 m (5' 5.98\")   Wt 80 kg (176 lb 5.9 oz) "   SpO2 98%   BMI 28.48 kg/m²   Smoking Status Never   BSA 1.93 m²       Assessment and Plan:     Patient is a 64 year old male scheduled for robot assisted right inguinal hernia repair with mesh placement on 1/2/25 with Dr. Galan.    Patient is at acceptable risk to proceed with planned surgical procedure. Further cardiac risk stratification deferred at this time.This patient is INTERMEDIATE risk candidate undergoing LOW risk procedure, patient is medically optimized for surgery.    Plan    Neuro:    No neurologic diagnosis, however, the patient is at increased risk for perioperative delirium secondary to  age  Patient is at increased risk for perioperative CVA secondary to  increased age    Cardiovascular:    RCRI: 0 Risk of Mace: 3.9%      Patient denies any chest pain, tightness, heaviness, pressure, radiating pain, palpitations, irregular heartbeats, lightheadedness, cough, congestion, shortness of breath, MUNOZ, PND, near syncope, weight loss or gain.    Good functional capacity  Functional 4 Mets. Patient denies SOB walking up 2 flights of stairs      EKG in [facility] 12/18/24  Encounter Date: 12/18/24   ECG 12 Lead   Result Value    Ventricular Rate 80    Atrial Rate 80    AZ Interval 152    QRS Duration 88    QT Interval 366    QTC Calculation(Bazett) 422    P Axis 99    R Axis -21    T Axis 32    QRS Count 14    Q Onset 211    P Onset 135    P Offset 181    T Offset 394    QTC Fredericia 403    Narrative    Normal sinus rhythm  RSR' or QR pattern in V1 suggests right ventricular conduction delay  Borderline ECG  No previous ECGs available  Confirmed by Jian Martínez ([id number]) on 12/18/2024 2:46:20 PM       Pulmonary:    No pulmonary diagnosis, however patient is at increased risk of perioperative complications secondary to  age > 60  Stop Bang score is 2 placing patient at LOW risk for MOISES  ARISCAT: <26 points, 1.6% risk of in-hospital postoperative pulmonary complication  PRODIGY: High risk for opioid induced  respiratory depression    Renal/endo:  Recommendations to avoid nephrotoxic drugs and carefully monitor fluid status to maintain euvolemia. Use dose adjusted medications as needed for the underlying level of renal function.    Thyroid cancer s/p thyroidectomy- continue Levothyroxine      Heme:  Patient instructed to ambulate as soon as possible postoperatively to decrease thromboembolic risk.    Initiate mechanical DVT prophylaxis as soon as possible and initiate chemical prophylaxis when deemed safe from a bleeding standpoint post surgery.        Caprini= 4        Risk assessment complete.  Patient is scheduled for a LOW surgical risk procedure. He IS considered medically optimized for the planned procedure.        Labs/testing obtained in PAT on 12/18/24: EKG, MRSA, CBC, BMP    Lab Results   Component Value Date    WBC 5.8 12/18/2024    HGB 12.9 (L) 12/18/2024    HCT 41.8 12/18/2024    MCV 63 (L) 12/18/2024     12/18/2024     Lab Results   Component Value Date    GLUCOSE 98 12/18/2024    CALCIUM 9.1 12/18/2024     12/18/2024    K 4.6 12/18/2024    CO2 29 12/18/2024     12/18/2024    BUN 17 12/18/2024    CREATININE 0.90 12/18/2024         Follow up/communication: none      Preoperative medication instructions were provided and reviewed with the patient.  Any additional testing or evaluation was explained to the patient.  Nothing by mouth instructions were discussed and patient's questions were answered prior to conclusion to this encounter.  Patient verbalized understanding of preoperative instructions given in preadmission testing; discharge instructions available in EMR.    This note was dictated with speech recognition.  Minor errors may have been detected during use of speech recognition.

## 2024-12-18 NOTE — PREPROCEDURE INSTRUCTIONS
Medication List            Accurate as of December 18, 2024  9:59 AM. Always use your most recent med list.                chlorhexidine 0.12 % solution  Commonly known as: Peridex  SWISH AND SPIT 15ML FOR 30 SECONDS NIGHT PRIOR TO SURGERY AND MORNING OF SURGERY  Medication Adjustments for Surgery: Take/Use as prescribed     DUPIXENT PEN SUBQ  Additional Medication Adjustments for Surgery: Take last dose 7 days before surgery  Notes to patient: Do NOT take next dose on 12/28/24. May resume after surgery     hydrocortisone 2.5 % cream  Medication Adjustments for Surgery: Do Not take on the morning of surgery     levothyroxine 175 mcg tablet  Commonly known as: Synthroid, Levoxyl  Take 1 tablet (175 mcg) by mouth once daily.  Medication Adjustments for Surgery: Take/Use as prescribed     tadalafil 10 mg tablet  Commonly known as: Cialis  Take 1 tablet (10 mg) by mouth once daily as needed for erectile dysfunction.  Medication Adjustments for Surgery: Take last dose 3 days before surgery     triamcinolone 0.1 % cream  Commonly known as: Kenalog  Medication Adjustments for Surgery: Do Not take on the morning of surgery              Preoperative Deep Breathing Exercises  Why it is important to do deep breathing exercises before my surgery?  Deep breathing exercises strengthen your breathing muscles.  This helps you to recover after your surgery and decreases the chance of breathing complications.  How are the deep breathing exercises done?  Sit straight with your back supported.  Breathe in deeply and slowly through your nose. Your lower rib cage should expand and your abdomen may move forward.  Hold that breath for 3 to 5 seconds.  Breathe out through pursed lips, slowly and completely.  Rest and repeat 10 times every hour while awake.  Rest longer if you become dizzy or lightheaded.      CONTACT SURGEON'S OFFICE IF YOU DEVELOP:  * Fever = 100.4 F   * New respiratory symptoms (e.g. cough, shortness of breath,  respiratory distress, sore throat)  * Recent loss of taste or smell  *Flu like symptoms such as headache, fatigue or gastrointestinal symptoms  * You develop any open sores, shingles, burning or painful urination   AND/OR:  * You no longer wish to have the surgery.  * Any other personal circumstances change that may lead to the need to cancel or defer this surgery.  *You were admitted to any hospital within one week of your planned procedure.    SMOKING:  *Quitting smoking can make a huge difference to your health and recovery from surgery.    *If you need help with quitting, call 6-320-QUIT-NOW.    THE DAY OF SURGERY:  *Do not eat any food after midnight the night before your surgery.   *YOU MUST drink 14 OUNCES of clear liquids TWO hours before your instructed ARRIVAL TIME to the hospital. This includes water, black tea/coffee (no milk or cream), apple juice, clear broth and electrolyte drinks (Gatorade).  Please avoid clear liquids that are red in color.   *You may chew gum/mints up to TWO hours before your surgery/procedure.    SURGICAL TIME:  *You will be contacted between 2 p.m. and 6 p.m. the business day before your surgery with your arrival time.  *If you haven't received a call by 6pm, call [phone].  *Scheduled surgery times may change and you will be notified if this occurs-check your personal voicemail for any updates.    ON THE MORNING OF SURGERY:  *Wear comfortable, loose fitting clothing.   *Do not use moisturizers, creams, lotions or perfume.  *All jewelry and valuables should be left at home.  *Prosthetic devices such as contact lenses, hearing aids, dentures, eyelash extensions, hairpins and body piercing must be removed before surgery.    BRING WITH YOU:  *Photo ID and insurance card  *Current list of medications and allergies  *Pacemaker/Defibrillator/Heart stent cards  *CPAP machine and mask  *Slings/splints/crutches  *Copy of your complete Advanced Directive/DHPOA-if  applicable  *Neurostimulator implant remote    PARKING AND ARRIVAL:  *Check in at the Main Entrance desk and let them know you are here for surgery.  *You will be directed to the 2nd floor surgical waiting area.    IF YOU ARE HAVING OUTPATIENT/SAME DAY SURGERY:  *A responsible adult MUST accompany you at the time of discharge and stay with you for 24 hours after your surgery.  *You may NOT drive yourself home after surgery.  *You may use a taxi or ride sharing service ([employer], Uber) to return home ONLY if you are accompanied by a friend or family member.  *Instructions for resuming your medications will be provided by your surgeon.                Patient Information: Oral/Dental Rinse  **This is a prescription; pick it up at your preferred local pharmacy **  What is oral/dental rinse?   It is a mouthwash. It is a way of cleaning the mouth with a germ killing solution before your surgery.  The solution contains chlorhexidine, commonly known as CHG.   It is used inside the mouth to kill a bacteria known as Staphylococcus aureus.  Let your doctor know if you are allergic to Chlorhexidine.    Why do I need to use CHG oral/dental rinse?  The CHG oral/dental rinse helps to kill a bacteria in your mouth known a Staphylococcus aureus.     This reduces the risk of infection at the surgical site.      Using your CHG oral/dental rinse  STEPS:  Use your CHG oral/dental rinse after you brush your teeth the night before (at bedtime) and the morning of your surgery.  Follow all directions on your prescription label.    Use the cap on the container to measure 15ml (fill cap to fill line)  Swish (gargle if you can) the mouthwash in your mouth for at least 30 seconds, (do not to swallow) spit out  After you use your CHG rinse, do not rinse your mouth with water, drink or eat.  Please refer to prescription label for the appropriate time to resume oral intake  Dental rinse comes in one size bottle: 473ml ~16oz.  You will have leftover     rinse, discard after this use.    What side effects might I have using the CHG oral/dental rinse?  CHG rinse will stick to plaque on the teeth.  Brush and floss just before use.  Teeth brushing will help avoid staining of plaque during use.    Who should I contact if I have questions about the CHG oral/dental rinse?  Please call [facility], Preadmission Testing at [phone] if you have any questions      Home Preoperative Antibacterial Shower     What is a home preoperative antibacterial shower?  This shower is a way of cleaning the skin with a germ killing soap before surgery.  The soap contains chlorhexidine, commonly known as CHG.  CHG is a soap for your skin with germ killing ability.  Let your doctor know if you are allergic to chlorhexidine.    Why do I need to take a preoperative antibacterial shower?  Skin is not sterile.  It is best to try to make your skin as free of germs as possible before surgery.  Proper cleansing with a germ killing soap before surgery can lower the number of germs on your skin.  This helps to reduce the risk of infection at the surgical site.  Following the instructions listed below will help you prepare your skin for surgery.      How do I use the CHG skin cleanser?  Steps:  Begin using your CHG soap five days before your scheduled surgery on ________________________.    Days 1-4 Shower before bed:  Wash your face and genitals with your normal soap and rinse.    Wash and rinse your hair using the CHG soap. Rinse completely, do not condition your   Hair.          3.    Apply the CHG soap to a clean wet washcloth.  Turn the water off or move away                From the water spray to avoid premature rinsing of the CHG soap as you are applying.     4.   Lather your entire body from the neck down.  Do not use on your face or genitals.   Pay special attention to the area(s) where your incision(s) will be located unless they are on your face.  Avoid  scrubbing your skin too hard.  The important point is to have the CHG soap sit on your skin for 3 minutes.    When the 3 minutes are up, turn on the water and rinse the CHG soap off your body completely.   Pat yourself dry with a clean, freshly-laundered towel.  Dress in clean, freshly laundered night clothes.    Be sure to sleep with clean, freshly laundered sheets.  Day 5:  Last shower is the morning before surgery: Follow above Instructions.    NOTE:    *Hair extensions should be removed    *Keep CHG soap out of eyes and ear canals   *DO NOT wash with regular soap on your body after you have used the CHG        soap solution  *DO NOT apply powders, lotions, or perfume.  *Deodorant may be used days 1-4, BUT NOT the day of surgery    Who should I contact if I have any questions regarding the use of CHG soap?  Call the [facility], Preadmission Testing at [phone] if you have any questions.              Patient Information: Pre-Operative Infection Prevention Measures     Why did I have my nose, under my arms and groin swabbed?  The purpose of the swab is to identify Staphylococcus aureus inside your nose or on your skin.  The swab was sent to the laboratory for culture.  A positive swab/culture for Staphylococcus aureus is called colonization or carriage.      What is Staphylococcus aureus?  Staphylococcus aureus, also known as “staph”, is a germ found on the skin or in the nose of healthy people.  Sometimes Staphylococcus aureus can get into the body and cause an infection.  This can be minor (such as pimples, boils or other skin problems).  It might also be serious (such as blood infection, pneumonia or a surgical site infection).    What is Staphylococcus aureus colonization or carriage?  Colonization or carriage means that a person has the germ but is not sick from it.  These bacteria can be spread on the hands or when breathing or sneezing.    How is Staphylococcus aureus  spread?  It is most often spread by close contact with a person or item that carries it.    What happens if my culture is positive for Staphylococcus aureus?  Your doctor/medical team will use this information to guide any antibiotic treatment which may be necessary.  Regardless of the culture results, we will clean the inside of your nose with a betadine swab just before you have your surgery.      Will I get an infection if I have Staphylococcus aureus in my nose or on my skin?  Anyone can get an infection with Staphylococcus aureus.  However, the best way to reduce your risk of infection is to follow the instructions provided to you for the use of your CHG soap and dental rinse.        Who should I contact if I have any questions?  Call the [facility], Preadmission Testing at [phone] if you have any questions.

## 2024-12-19 ENCOUNTER — APPOINTMENT (OUTPATIENT)
Dept: PRIMARY CARE | Facility: CLINIC | Age: 64
End: 2024-12-19
Payer: COMMERCIAL

## 2024-12-20 LAB — STAPHYLOCOCCUS SPEC CULT: NORMAL

## 2025-01-02 ENCOUNTER — ANESTHESIA EVENT (OUTPATIENT)
Dept: OPERATING ROOM | Facility: HOSPITAL | Age: 65
End: 2025-01-02
Payer: COMMERCIAL

## 2025-01-02 ENCOUNTER — HOSPITAL ENCOUNTER (OUTPATIENT)
Facility: HOSPITAL | Age: 65
Setting detail: OUTPATIENT SURGERY
Discharge: HOME | End: 2025-01-02
Attending: SURGERY | Admitting: SURGERY
Payer: COMMERCIAL

## 2025-01-02 ENCOUNTER — ANESTHESIA (OUTPATIENT)
Dept: OPERATING ROOM | Facility: HOSPITAL | Age: 65
End: 2025-01-02
Payer: COMMERCIAL

## 2025-01-02 VITALS
TEMPERATURE: 97.5 F | OXYGEN SATURATION: 94 % | DIASTOLIC BLOOD PRESSURE: 79 MMHG | RESPIRATION RATE: 14 BRPM | SYSTOLIC BLOOD PRESSURE: 140 MMHG | WEIGHT: 176.15 LBS | HEIGHT: 66 IN | HEART RATE: 74 BPM | BODY MASS INDEX: 28.31 KG/M2

## 2025-01-02 DIAGNOSIS — K40.90 UNILATERAL INGUINAL HERNIA WITHOUT OBSTRUCTION OR GANGRENE, RECURRENCE NOT SPECIFIED: Primary | ICD-10-CM

## 2025-01-02 PROBLEM — C73 THYROID CANCER (MULTI): Status: RESOLVED | Noted: 2023-11-02 | Resolved: 2025-01-02

## 2025-01-02 PROCEDURE — 7100000002 HC RECOVERY ROOM TIME - EACH INCREMENTAL 1 MINUTE: Performed by: SURGERY

## 2025-01-02 PROCEDURE — 49650 LAP ING HERNIA REPAIR INIT: CPT | Performed by: SURGERY

## 2025-01-02 PROCEDURE — 2500000004 HC RX 250 GENERAL PHARMACY W/ HCPCS (ALT 636 FOR OP/ED): Performed by: SURGERY

## 2025-01-02 PROCEDURE — 3700000001 HC GENERAL ANESTHESIA TIME - INITIAL BASE CHARGE: Performed by: SURGERY

## 2025-01-02 PROCEDURE — 3600000017 HC OR TIME - EACH INCREMENTAL 1 MINUTE - PROCEDURE LEVEL SIX: Performed by: SURGERY

## 2025-01-02 PROCEDURE — A49650 PR LAP,INGUINAL HERNIA REPR,INITIAL: Performed by: NURSE ANESTHETIST, CERTIFIED REGISTERED

## 2025-01-02 PROCEDURE — 2720000007 HC OR 272 NO HCPCS: Performed by: SURGERY

## 2025-01-02 PROCEDURE — 3700000002 HC GENERAL ANESTHESIA TIME - EACH INCREMENTAL 1 MINUTE: Performed by: SURGERY

## 2025-01-02 PROCEDURE — 3600000018 HC OR TIME - INITIAL BASE CHARGE - PROCEDURE LEVEL SIX: Performed by: SURGERY

## 2025-01-02 PROCEDURE — 7100000010 HC PHASE TWO TIME - EACH INCREMENTAL 1 MINUTE: Performed by: SURGERY

## 2025-01-02 PROCEDURE — 7100000001 HC RECOVERY ROOM TIME - INITIAL BASE CHARGE: Performed by: SURGERY

## 2025-01-02 PROCEDURE — C1781 MESH (IMPLANTABLE): HCPCS | Performed by: SURGERY

## 2025-01-02 PROCEDURE — A49650 PR LAP,INGUINAL HERNIA REPR,INITIAL: Performed by: ANESTHESIOLOGY

## 2025-01-02 PROCEDURE — 2500000004 HC RX 250 GENERAL PHARMACY W/ HCPCS (ALT 636 FOR OP/ED): Performed by: NURSE ANESTHETIST, CERTIFIED REGISTERED

## 2025-01-02 PROCEDURE — 2500000005 HC RX 250 GENERAL PHARMACY W/O HCPCS: Performed by: ANESTHESIOLOGY

## 2025-01-02 PROCEDURE — 7100000009 HC PHASE TWO TIME - INITIAL BASE CHARGE: Performed by: SURGERY

## 2025-01-02 PROCEDURE — 2500000004 HC RX 250 GENERAL PHARMACY W/ HCPCS (ALT 636 FOR OP/ED): Performed by: ANESTHESIOLOGY

## 2025-01-02 PROCEDURE — 2780000003 HC OR 278 NO HCPCS: Performed by: SURGERY

## 2025-01-02 DEVICE — 3DMAX MID ANATOMICAL MESH, 10 CM X 16 CM (4" X 6"), LARGE, RIGHT
Type: IMPLANTABLE DEVICE | Site: ABDOMEN | Status: FUNCTIONAL
Brand: 3DMAX

## 2025-01-02 RX ORDER — KETOROLAC TROMETHAMINE 30 MG/ML
INJECTION, SOLUTION INTRAMUSCULAR; INTRAVENOUS AS NEEDED
Status: DISCONTINUED | OUTPATIENT
Start: 2025-01-02 | End: 2025-01-02

## 2025-01-02 RX ORDER — LIDOCAINE HYDROCHLORIDE 20 MG/ML
INJECTION, SOLUTION INFILTRATION; PERINEURAL AS NEEDED
Status: DISCONTINUED | OUTPATIENT
Start: 2025-01-02 | End: 2025-01-02

## 2025-01-02 RX ORDER — ONDANSETRON HYDROCHLORIDE 2 MG/ML
INJECTION, SOLUTION INTRAVENOUS AS NEEDED
Status: DISCONTINUED | OUTPATIENT
Start: 2025-01-02 | End: 2025-01-02

## 2025-01-02 RX ORDER — CEFAZOLIN 1 G/1
INJECTION, POWDER, FOR SOLUTION INTRAVENOUS AS NEEDED
Status: DISCONTINUED | OUTPATIENT
Start: 2025-01-02 | End: 2025-01-02

## 2025-01-02 RX ORDER — SODIUM CHLORIDE 9 MG/ML
INJECTION, SOLUTION INTRAVENOUS CONTINUOUS PRN
Status: DISCONTINUED | OUTPATIENT
Start: 2025-01-02 | End: 2025-01-02

## 2025-01-02 RX ORDER — ONDANSETRON HYDROCHLORIDE 2 MG/ML
4 INJECTION, SOLUTION INTRAVENOUS ONCE AS NEEDED
Status: DISCONTINUED | OUTPATIENT
Start: 2025-01-02 | End: 2025-01-02 | Stop reason: HOSPADM

## 2025-01-02 RX ORDER — SCOPOLAMINE 1 MG/3D
1 PATCH, EXTENDED RELEASE TRANSDERMAL
Status: DISCONTINUED | OUTPATIENT
Start: 2025-01-02 | End: 2025-01-02 | Stop reason: HOSPADM

## 2025-01-02 RX ORDER — ROCURONIUM BROMIDE 10 MG/ML
INJECTION, SOLUTION INTRAVENOUS AS NEEDED
Status: DISCONTINUED | OUTPATIENT
Start: 2025-01-02 | End: 2025-01-02

## 2025-01-02 RX ORDER — DROPERIDOL 2.5 MG/ML
0.62 INJECTION, SOLUTION INTRAMUSCULAR; INTRAVENOUS ONCE AS NEEDED
Status: DISCONTINUED | OUTPATIENT
Start: 2025-01-02 | End: 2025-01-02 | Stop reason: HOSPADM

## 2025-01-02 RX ORDER — OXYCODONE HYDROCHLORIDE 5 MG/1
5 TABLET ORAL
Status: DISCONTINUED | OUTPATIENT
Start: 2025-01-02 | End: 2025-01-02 | Stop reason: HOSPADM

## 2025-01-02 RX ORDER — SODIUM CHLORIDE, SODIUM LACTATE, POTASSIUM CHLORIDE, CALCIUM CHLORIDE 600; 310; 30; 20 MG/100ML; MG/100ML; MG/100ML; MG/100ML
100 INJECTION, SOLUTION INTRAVENOUS CONTINUOUS
Status: DISCONTINUED | OUTPATIENT
Start: 2025-01-02 | End: 2025-01-02 | Stop reason: HOSPADM

## 2025-01-02 RX ORDER — OXYCODONE HYDROCHLORIDE 5 MG/1
5 TABLET ORAL EVERY 6 HOURS PRN
Qty: 12 TABLET | Refills: 0 | Status: SHIPPED | OUTPATIENT
Start: 2025-01-02

## 2025-01-02 RX ORDER — MIDAZOLAM HYDROCHLORIDE 1 MG/ML
INJECTION INTRAMUSCULAR; INTRAVENOUS AS NEEDED
Status: DISCONTINUED | OUTPATIENT
Start: 2025-01-02 | End: 2025-01-02

## 2025-01-02 RX ORDER — PROCHLORPERAZINE EDISYLATE 5 MG/ML
5 INJECTION INTRAMUSCULAR; INTRAVENOUS ONCE AS NEEDED
Status: DISCONTINUED | OUTPATIENT
Start: 2025-01-02 | End: 2025-01-02 | Stop reason: HOSPADM

## 2025-01-02 RX ORDER — HYDRALAZINE HYDROCHLORIDE 20 MG/ML
5 INJECTION INTRAMUSCULAR; INTRAVENOUS EVERY 30 MIN PRN
Status: DISCONTINUED | OUTPATIENT
Start: 2025-01-02 | End: 2025-01-02 | Stop reason: HOSPADM

## 2025-01-02 RX ORDER — PROPOFOL 10 MG/ML
INJECTION, EMULSION INTRAVENOUS AS NEEDED
Status: DISCONTINUED | OUTPATIENT
Start: 2025-01-02 | End: 2025-01-02

## 2025-01-02 RX ORDER — FENTANYL CITRATE 50 UG/ML
INJECTION, SOLUTION INTRAMUSCULAR; INTRAVENOUS AS NEEDED
Status: DISCONTINUED | OUTPATIENT
Start: 2025-01-02 | End: 2025-01-02

## 2025-01-02 RX ADMIN — DEXAMETHASONE SODIUM PHOSPHATE 8 MG: 4 INJECTION, SOLUTION INTRAMUSCULAR; INTRAVENOUS at 10:28

## 2025-01-02 RX ADMIN — PROPOFOL 200 MG: 10 INJECTION, EMULSION INTRAVENOUS at 09:03

## 2025-01-02 RX ADMIN — SUGAMMADEX 150 MG: 100 INJECTION, SOLUTION INTRAVENOUS at 10:48

## 2025-01-02 RX ADMIN — ROCURONIUM BROMIDE 60 MG: 10 INJECTION, SOLUTION INTRAVENOUS at 09:03

## 2025-01-02 RX ADMIN — MIDAZOLAM HYDROCHLORIDE 2 MG: 1 INJECTION, SOLUTION INTRAMUSCULAR; INTRAVENOUS at 08:57

## 2025-01-02 RX ADMIN — FENTANYL CITRATE 50 MCG: 50 INJECTION, SOLUTION INTRAMUSCULAR; INTRAVENOUS at 10:55

## 2025-01-02 RX ADMIN — LIDOCAINE HYDROCHLORIDE 100 MG: 20 INJECTION, SOLUTION INFILTRATION; PERINEURAL at 09:03

## 2025-01-02 RX ADMIN — FENTANYL CITRATE 25 MCG: 50 INJECTION, SOLUTION INTRAMUSCULAR; INTRAVENOUS at 10:12

## 2025-01-02 RX ADMIN — KETOROLAC TROMETHAMINE 30 MG: 30 INJECTION, SOLUTION INTRAMUSCULAR at 10:29

## 2025-01-02 RX ADMIN — ROCURONIUM BROMIDE 20 MG: 10 INJECTION, SOLUTION INTRAVENOUS at 10:12

## 2025-01-02 RX ADMIN — SODIUM CHLORIDE: 9 INJECTION, SOLUTION INTRAVENOUS at 08:57

## 2025-01-02 RX ADMIN — SCOPOLAMINE 1 PATCH: 1.5 PATCH, EXTENDED RELEASE TRANSDERMAL at 08:51

## 2025-01-02 RX ADMIN — Medication 3 L/MIN: at 10:57

## 2025-01-02 RX ADMIN — ROCURONIUM BROMIDE 20 MG: 10 INJECTION, SOLUTION INTRAVENOUS at 09:46

## 2025-01-02 RX ADMIN — FENTANYL CITRATE 25 MCG: 50 INJECTION, SOLUTION INTRAMUSCULAR; INTRAVENOUS at 09:03

## 2025-01-02 RX ADMIN — ONDANSETRON 4 MG: 2 INJECTION, SOLUTION INTRAMUSCULAR; INTRAVENOUS at 10:31

## 2025-01-02 RX ADMIN — CEFAZOLIN 2 G: 330 INJECTION, POWDER, FOR SOLUTION INTRAMUSCULAR; INTRAVENOUS at 09:03

## 2025-01-02 SDOH — HEALTH STABILITY: MENTAL HEALTH: CURRENT SMOKER: 0

## 2025-01-02 ASSESSMENT — COLUMBIA-SUICIDE SEVERITY RATING SCALE - C-SSRS
6. HAVE YOU EVER DONE ANYTHING, STARTED TO DO ANYTHING, OR PREPARED TO DO ANYTHING TO END YOUR LIFE?: NO
2. HAVE YOU ACTUALLY HAD ANY THOUGHTS OF KILLING YOURSELF?: NO
1. IN THE PAST MONTH, HAVE YOU WISHED YOU WERE DEAD OR WISHED YOU COULD GO TO SLEEP AND NOT WAKE UP?: NO

## 2025-01-02 ASSESSMENT — PAIN - FUNCTIONAL ASSESSMENT
PAIN_FUNCTIONAL_ASSESSMENT: 0-10

## 2025-01-02 ASSESSMENT — PAIN SCALES - GENERAL
PAINLEVEL_OUTOF10: 0 - NO PAIN
PAINLEVEL_OUTOF10: 2

## 2025-01-02 NOTE — ANESTHESIA POSTPROCEDURE EVALUATION
Patient: Dani Young    Procedure Summary       Date: 01/02/25 Room / Location: [facility] OR 08 / Virtual [facility] OR    Anesthesia Start: 0857 Anesthesia Stop: 1059    Procedure: Robot Assisted Right Inguinal Hernia Repair; Mesh Placement (Right) Diagnosis:       Unilateral inguinal hernia without obstruction or gangrene, recurrence not specified      (Unilateral inguinal hernia without obstruction or gangrene, recurrence not specified [K40.90])    Surgeons: Adolfo Galan MD Responsible Provider: Ruth Alberto MD    Anesthesia Type: general ASA Status: 3            Anesthesia Type: general    Vitals Value Taken Time   /76 01/02/25 1116   Temp 36.6 °C (97.9 °F) 01/02/25 1057   Pulse 75 01/02/25 1128   Resp 12 01/02/25 1115   SpO2 95 % 01/02/25 1128   Vitals shown include unfiled device data.    Anesthesia Post Evaluation    Patient location during evaluation: PACU  Patient participation: complete - patient participated  Level of consciousness: awake  Pain management: adequate  Multimodal analgesia pain management approach  Airway patency: patent  Cardiovascular status: hemodynamically stable  Respiratory status: spontaneous ventilation  Hydration status: euvolemic  Postoperative Nausea and Vomiting: none        No notable events documented.

## 2025-01-02 NOTE — ANESTHESIA PROCEDURE NOTES
Airway  Date/Time: 1/2/2025 9:06 AM  Urgency: elective    Airway not difficult    Staffing  Performed: CRNA   Authorized by: Ruth Alberto MD    Performed by: DAVIE Guy-MITCHELL  Patient location during procedure: OR    Indications and Patient Condition  Indications for airway management: anesthesia and airway protection  Spontaneous ventilation: present  Sedation level: deep  Preoxygenated: yes  Patient position: sniffing  MILS maintained throughout  Mask difficulty assessment: 2 - vent by mask + OA or adjuvant +/- NMBA  No planned trial extubation    Final Airway Details  Final airway type: endotracheal airway      Successful airway: ETT  Cuffed: yes   Successful intubation technique: video laryngoscopy  Facilitating devices/methods: intubating stylet  Endotracheal tube insertion site: oral  Blade type: Posadas.  Blade size: #3  ETT size (mm): 7.0  Cormack-Lehane Classification: grade I - full view of glottis  Placement verified by: capnometry   Cuff volume (mL): 6  Measured from: lips  ETT to lips (cm): 21  Number of attempts at approach: 1  Number of other approaches attempted: 0    Additional Comments  All dental protected

## 2025-01-02 NOTE — PERIOPERATIVE NURSING NOTE
1057 Arrival to PACU. Drowsy but easily arousable. Denies pain. Lap sites to abdomen x3 sealed with skin glue cdi.     1145 Arouses spontaneously but remains drowsy. Tolerating PO. Wife updated via phone call.     1200 Awake and alert. Denies pain. Instructed on use of Incentive Spirometer, good effort, able to inspire up to 2500mL.     1215 Meets criteria for Phase 2.

## 2025-01-02 NOTE — OP NOTE
Robot Assisted Right Inguinal Hernia Repair; Mesh Placement (R) Operative Note     Date: 2025  OR Location: [facility] A OR    Name: Dani Young : 1960, Age: 64 y.o., MRN: [id number], Sex: male    Diagnosis  Pre-op Diagnosis      * Right inguinal hernia without obstruction or gangrene, recurrence not specified [K40.90] Post-op Diagnosis     * Right inguinal hernia without obstruction or gangrene, recurrence not specified [K40.90]     Procedures  Robot Assisted Right Inguinal Hernia Repair; Mesh Placement  27932 - TN LAPAROSCOPY SURG RPR INITIAL INGUINAL HERNIA      Surgeons      * Adolfo Galan - Primary    Resident/Fellow/Other Assistant:  Surgeons and Role:  * No surgeons found with a matching role *    Staff:   Surgical Assistant:   Sarah: Marlen Arteaga Person: Marline    Anesthesia Staff: Anesthesiologist: Ruht Alberto MD  CRNA: DAVIE Guy-MITCHELL    Procedure Summary  Anesthesia: General  ASA: III  Estimated Blood Loss: 5 mL  Intra-op Medications:   Administrations occurring from 0730 to 1000 on 25:   Medication Name Total Dose   BUPivacaine HCl (Marcaine) 0.5 % (5 mg/mL) 30 mL, lidocaine (Xylocaine) 30 mL syringe Cannot be calculated   ceFAZolin (Ancef) 1 g 2 g   fentaNYL (Sublimaze) injection 50 mcg/mL 25 mcg   lidocaine (Xylocaine) injection 2 % 100 mg   midazolam PF (Versed) injection 1 mg/mL 2 mg   propofol (Diprivan) injection 10 mg/mL 200 mg   rocuronium (ZeMuron) 50 mg/5 mL injection 80 mg   scopolamine (Transderm-Scop) patch 1 patch 1 patch   NaCl 0.9 % infusion 52.5 mL              Anesthesia Record               Intraprocedure I/O Totals       None           Specimen: No specimens collected              Drains and/or Catheters:   Urethral Catheter 16 Fr. (Active)       Findings: moderate sized indirect right inguinal hernia     Indications: Dani Young is an 64 y.o. male who is having surgery for right inguinal hernia without obstruction or gangrene, recurrence not specified [K40.90].      The patient was seen in the preoperative area. The risks, benefits, complications, treatment options, non-operative alternatives, expected recovery and outcomes were discussed with the patient. The possibilities of reaction to medication, pulmonary aspiration, injury to surrounding structures, bleeding, recurrent infection, the need for additional procedures, failure to diagnose a condition, and creating a complication requiring transfusion or operation were discussed with the patient. The patient concurred with the proposed plan, giving informed consent.  The site of surgery was properly noted/marked if necessary per policy. The patient has been actively warmed in preoperative area. Preoperative antibiotics have been ordered and given within 1 hours of incision. Venous thrombosis prophylaxis have been ordered including bilateral sequential compression devices     Procedure Details: CLINICAL NOTE:     Mr. Dani Young is a 64 y.o. year old male with who comes in for elective repair of a symptomatic right inguinal hernia.   Risk benefits alternatives were discussed preoperatively and he agrees to the operation.      DESCRIPTION OF PROCEDURE:    The patient was taken to the operating room, placed supine on the operating table.  Time-out was established.  General anesthesia was induced.  He was given antibiotics.  A Kelley catheter was placed that was later removed after the procedure.  The abdomen was prepped and draped in a sterile fashion.  We made a supraumbilical midline incision, placed a 8 mm Izzy trocar.  We established insufflation. We placed 8 mm ports in the left and right upper abdominal wall respectively.  The patient had been placed in the Trendelenburg position.  We then docked the robot to our ports.   I scrubbed out and performed the operation from a console in the corner of the room.      He was noted to have a moderate-sized indirect right inguinal hernia.  There was no obvious left inguinal  hernia.   We scored the peritoneum just below the right inguinal ligament and very carefully took the peritoneum down from the right lower abdominal wall.  With very careful dissection we reduced the hernia sac and isolated the cord structures.  There was no obvious direct component.  The entire myopectineal orifice was exposed.  We then introduced a  large right-sided 3DMax mesh through our camera port, positioned this over the hernia defect, and secured it into place using interrupted  3-0 Vicryl stitches.  Sutures were placed anteriorly, laterally and at coopers ligament.  Next we re-peritonealized our repair by sewing the peritoneum up over the mesh using a 3-0 absorbable V-Loc suture.     We then removed our ports and the incisions infiltrated with 1% marcaine 0.05% lidocaine solution.  Next we closed our midline fascial defect using 0 Vicryl suture placed in a figure of eight fashion.  Skin was reapproximated using 3-0 and 4-0 subcuticular Vicryl stitches followed by Dermabond glue.  The patient tolerated the procedure without difficulty and was returned to the recovery room in stable condition.         Complications:  None; patient tolerated the procedure well.    Disposition: PACU - hemodynamically stable.  Condition: stable           Attending Attestation: I was present and scrubbed for the entire procedure.    Adolfo Galan  Phone Number: [phone]

## 2025-01-02 NOTE — ANESTHESIA PREPROCEDURE EVALUATION
Patient: Dani Young    Procedure Information       Date/Time: 01/02/25 5203    Procedure: Robot Assisted Right Inguinal Hernia Repair; Mesh Placement (Right)    Location: [facility] A OR 08 / Virtual [facility] A OR    Surgeons: Adolfo Galan MD                                                         Pre- Anesthesia Evaluation                                            Dani Young is a 64 y.o. male who presents for the above mentioned procedure due to Unilateral inguinal hernia without obstruction or gangrene, recurrence not specified [K40.90]      Relevant Problems   Anesthesia   (+) PONV (postoperative nausea and vomiting)      /Renal   (+) BPH with obstruction/lower urinary tract symptoms      Endocrine   (+) Hypothyroidism   (+) Thyroid cancer (Multi) (Resolved)      Musculoskeletal   (+) DDD (degenerative disc disease), cervical   (+) Spondylosis without myelopathy or radiculopathy, cervical region      Skin   (+) Squamous cell carcinoma of skin of face (Resolved)      Nervous   (+) Polymyalgia rheumatica (Multi)      Circulatory   (+) Elevated coronary artery calcium score      Digestive   (+) Unilateral inguinal hernia without obstruction or gangrene          Past Surgical History:   Procedure Laterality Date    COLONOSCOPY      HERNIA REPAIR      umbilical    LASIK      PROSTATE SURGERY      HoLEP    SHOULDER ARTHROSCOPY Left 1980    THYROIDECTOMY  2013    Thyroidectomy total    TOTAL HIP ARTHROPLASTY Bilateral     TRANSURETHRAL RESECTION OF PROSTATE      x 2    US GUIDED THYROID BIOPSY  09/10/2013    US GUIDED THYROID BIOPSY 9/10/2013 [facility] ANCILLARY LEGACY     Social History   He reports that he has never smoked. He has never used smokeless tobacco. He reports that he does not drink alcohol and does not use drugs.    Allergies and Medications   Allergies   Allergen Reactions    Penicillins Unknown         Current Outpatient Medications   Medication Instructions    chlorhexidine (Peridex) 0.12 % solution SWISH AND SPIT  "15ML FOR 30 SECONDS NIGHT PRIOR TO SURGERY AND MORNING OF SURGERY    dupilumab (DUPIXENT PEN SUBQ) 300 mg, Every 14 days    hydrocortisone 2.5 % cream 1 Application, As needed    levothyroxine (SYNTHROID, LEVOXYL) 175 mcg, oral, Daily    tadalafil (CIALIS) 10 mg, oral, Daily PRN    triamcinolone (Kenalog) 0.1 % cream 1 Application, Daily PRN       Recent Labs     12/18/24  1032 11/13/24  1032   WBC 5.8 7.4   HGB 12.9* 13.2*   HCT 41.8 43.7    273   MCV 63* 63*     Recent Labs     12/18/24  1032 10/24/23  0751 04/09/22  0858   EGFR >90 77  --    ANIONGAP 9* 12 11   BUN 17 17 16   CREATININE 0.90 1.09 1.13    140 141   K 4.6 4.1 4.4    103 105   CO2 29 29 29   GLUCOSE 98 100* 86     Recent Labs     12/06/24  1038 05/08/24  1135 12/07/23  1436   HGBA1C  --  5.4  --    TSH 1.20  --  2.09   FREET4 1.73*  --  1.33     Recent Labs     12/18/24  1032 10/24/23  0751   CALCIUM 9.1 9.0     Lab Results   Component Value Date    STAPHMRSASCR No Staphylococcus aureus isolated 12/18/2024       Encounter Date: 12/18/24   ECG 12 Lead   Result Value    Ventricular Rate 80    Atrial Rate 80    KS Interval 152    QRS Duration 88    QT Interval 366    QTC Calculation(Bazett) 422    P Axis 99    R Axis -21    T Axis 32    QRS Count 14    Q Onset 211    P Onset 135    P Offset 181    T Offset 394    QTC Fredericia 403    Narrative    Normal sinus rhythm  RSR' or QR pattern in V1 suggests right ventricular conduction delay  Borderline ECG  No previous ECGs available  Confirmed by Jian Martínez ([id number]) on 12/18/2024 2:46:20 PM      Visit Vitals  /89   Pulse 85   Temp 36 °C (96.8 °F) (Temporal)   Resp 16   Ht 1.676 m (5' 6\")   Wt 79.9 kg (176 lb 2.4 oz)   SpO2 96%   BMI 28.43 kg/m²   Smoking Status Never   BSA 1.93 m²                 Clinical information reviewed:   Tobacco  Allergies  Meds   Med Hx  Surg Hx   Fam Hx  Soc Hx        NPO Detail:  NPO/Void Status  Carbohydrate Drink Given Prior to Surgery? : N  Date " of Last Liquid: 01/02/25  Time of Last Liquid: 2000  Date of Last Solid: 01/02/25  Time of Last Solid: 2000  Last Intake Type: Clear fluids  Time of Last Void: 0800         Physical Exam    Airway  Mallampati: III  TM distance: >3 FB  Neck ROM: full     Cardiovascular   Rhythm: regular  Rate: normal     Dental   Comments: Multiple dental implants and crowns; top front row of teeth are veneers per patient   Pulmonary   Comments: Normal RR  Non-labored respiration    Abdominal            Anesthesia Plan    History of general anesthesia?: yes  History of complications of general anesthesia?: yes    ASA 3     general   (General with ETT. Standard ASA monitoring)  The patient is not a current smoker.    intravenous induction   Postoperative administration of opioids is intended.  Anesthetic plan and risks discussed with patient.  Use of blood products discussed with patient who consented to blood products.    Plan discussed with CAA and CRNA.

## 2025-01-03 ASSESSMENT — PAIN SCALES - GENERAL: PAINLEVEL_OUTOF10: 0 - NO PAIN

## 2025-01-14 ENCOUNTER — APPOINTMENT (OUTPATIENT)
Dept: ORTHOPEDIC SURGERY | Facility: CLINIC | Age: 65
End: 2025-01-14
Payer: COMMERCIAL

## 2025-01-17 ENCOUNTER — OFFICE VISIT (OUTPATIENT)
Dept: SURGERY | Facility: CLINIC | Age: 65
End: 2025-01-17
Payer: COMMERCIAL

## 2025-01-17 VITALS
DIASTOLIC BLOOD PRESSURE: 75 MMHG | WEIGHT: 176 LBS | BODY MASS INDEX: 28.41 KG/M2 | HEART RATE: 117 BPM | SYSTOLIC BLOOD PRESSURE: 137 MMHG

## 2025-01-17 DIAGNOSIS — Z09 POSTOPERATIVE EXAMINATION: Primary | ICD-10-CM

## 2025-01-17 PROCEDURE — 99211 OFF/OP EST MAY X REQ PHY/QHP: CPT | Performed by: SURGERY

## 2025-01-17 PROCEDURE — 1036F TOBACCO NON-USER: CPT | Performed by: SURGERY

## 2025-01-17 ASSESSMENT — ENCOUNTER SYMPTOMS
LOSS OF SENSATION IN FEET: 0
OCCASIONAL FEELINGS OF UNSTEADINESS: 0
DEPRESSION: 0

## 2025-01-17 ASSESSMENT — PAIN SCALES - GENERAL: PAINLEVEL_OUTOF10: 2

## 2025-01-17 NOTE — PROGRESS NOTES
Mr. Dani Young is a 64 y.o. year old male who comes in today for follow-up after undergoing robotic right inguinal hernia repair with mesh.  This procedure was performed on 1/2/25    Patient is doing very well and is pleased with the outcome of the surgery.    Tolerating a regular diet, bowel movements are normal    On exam patient looks well    Incisions are healing very nicely    Impression: Doing well following  robotic right inguinal hernia repair     Discussed increasing activity level    Follow-up with me as needed    Subjective   Patient ID: Dani Young is a 64 y.o. male who presents for Post-op.  HPI    Review of Systems    Objective   Physical Exam    Assessment/Plan            Adolfo Galan MD 01/17/25 2:48 PM

## 2025-01-17 NOTE — LETTER
January 17, 2025     Lindsey Etienne MD  [address]  [address] [address]    Patient: Dani Young   YOB: 1960   Date of Visit: 1/17/2025       Dear Dr. Lindsey Etienne MD:    Thank you for referring Dani Young to me for evaluation. Below are my notes for this consultation.  If you have questions, please do not hesitate to call me. I look forward to following your patient along with you.       Sincerely,     Adolfo Galan MD      CC: Sania Diaz, DO  ______________________________________________________________________________________       Mr. Dani Young is a 64 y.o. year old male who comes in today for follow-up after undergoing robotic right inguinal hernia repair with mesh.  This procedure was performed on 1/2/25    Patient is doing very well and is pleased with the outcome of the surgery.    Tolerating a regular diet, bowel movements are normal    On exam patient looks well    Incisions are healing very nicely    Impression: Doing well following  robotic right inguinal hernia repair     Discussed increasing activity level    Follow-up with me as needed    Subjective  Patient ID: Dani Young is a 64 y.o. male who presents for Post-op.  HPI    Review of Systems    Objective  Physical Exam    Assessment/Plan           Adolfo Galan MD 01/17/25 2:48 PM

## 2025-01-30 ASSESSMENT — PROMIS GLOBAL HEALTH SCALE
EMOTIONAL_PROBLEMS: NEVER
RATE_GENERAL_HEALTH: GOOD
RATE_AVERAGE_PAIN: 3
RATE_AVERAGE_FATIGUE: MILD
CARRYOUT_PHYSICAL_ACTIVITIES: MOSTLY
RATE_MENTAL_HEALTH: VERY GOOD
RATE_SOCIAL_SATISFACTION: VERY GOOD
CARRYOUT_SOCIAL_ACTIVITIES: VERY GOOD
RATE_QUALITY_OF_LIFE: GOOD
RATE_PHYSICAL_HEALTH: GOOD

## 2025-01-31 ENCOUNTER — APPOINTMENT (OUTPATIENT)
Dept: PRIMARY CARE | Facility: CLINIC | Age: 65
End: 2025-01-31
Payer: COMMERCIAL

## 2025-01-31 VITALS
TEMPERATURE: 97 F | SYSTOLIC BLOOD PRESSURE: 132 MMHG | HEART RATE: 83 BPM | DIASTOLIC BLOOD PRESSURE: 80 MMHG | OXYGEN SATURATION: 95 %

## 2025-01-31 DIAGNOSIS — M35.3 POLYMYALGIA RHEUMATICA (MULTI): ICD-10-CM

## 2025-01-31 DIAGNOSIS — D03.59: ICD-10-CM

## 2025-01-31 DIAGNOSIS — Z12.11 SCREENING FOR COLON CANCER: ICD-10-CM

## 2025-01-31 DIAGNOSIS — N13.8 BPH WITH OBSTRUCTION/LOWER URINARY TRACT SYMPTOMS: ICD-10-CM

## 2025-01-31 DIAGNOSIS — Z11.59 ENCOUNTER FOR HEPATITIS C SCREENING TEST FOR LOW RISK PATIENT: ICD-10-CM

## 2025-01-31 DIAGNOSIS — Z00.00 PHYSICAL EXAM, ANNUAL: Primary | ICD-10-CM

## 2025-01-31 DIAGNOSIS — N40.1 BPH WITH OBSTRUCTION/LOWER URINARY TRACT SYMPTOMS: ICD-10-CM

## 2025-01-31 DIAGNOSIS — R71.8 LOW MEAN CORPUSCULAR VOLUME (MCV): ICD-10-CM

## 2025-01-31 DIAGNOSIS — D22.60 MELANOCYTIC NEVI OF UNSPECIFIED UPPER LIMB, INCLUDING SHOULDER: ICD-10-CM

## 2025-01-31 DIAGNOSIS — Z13.1 SCREENING FOR DIABETES MELLITUS: ICD-10-CM

## 2025-01-31 DIAGNOSIS — Z13.220 LIPID SCREENING: ICD-10-CM

## 2025-01-31 DIAGNOSIS — Z83.3 FAMILY HISTORY OF DIABETES MELLITUS: ICD-10-CM

## 2025-01-31 PROCEDURE — 99396 PREV VISIT EST AGE 40-64: CPT | Performed by: FAMILY MEDICINE

## 2025-01-31 PROCEDURE — 1036F TOBACCO NON-USER: CPT | Performed by: FAMILY MEDICINE

## 2025-01-31 ASSESSMENT — ENCOUNTER SYMPTOMS
LIGHT-HEADEDNESS: 0
SHORTNESS OF BREATH: 0
APPETITE CHANGE: 0
FEVER: 0
FATIGUE: 0
ABDOMINAL PAIN: 0
CHILLS: 0
DIZZINESS: 0
ACTIVITY CHANGE: 0

## 2025-01-31 NOTE — PROGRESS NOTES
Subjective   Patient ID: Dani Young is a 64 y.o. male who presents for Annual Exam (cpe).    HPI   History of BPH with LUTS.   On dupixent for eczema     Thyroid cancer  Sees Dr De La Garza once a year   Currently on levothyroxine 175 mcg     PMR   Sees Dr Silva   In remssion per the note   No meds for this currently     The patient presents for his annual wellness exam.   Hx of colonoscopy:   Hx of prostate cancer screening (men 55-69):   AAA screening (men 65-75 smoking hx):   Lung cancer screening:   HIV and Hep C Screening:   STD screening:   Immunizations:  History of depression or anxiety:  Diet: Follows a healthy diet  Exercise: Gets regular exercise    Review of Systems   Constitutional:  Negative for activity change, appetite change, chills, fatigue and fever.   Respiratory:  Negative for shortness of breath.    Cardiovascular:  Negative for chest pain.   Gastrointestinal:  Negative for abdominal pain.   Skin:  Negative for rash.   Neurological:  Negative for dizziness and light-headedness.     Objective   /80   Pulse 83   Temp 36.1 °C (97 °F)   SpO2 95%     Physical Exam  Vitals reviewed.   Constitutional:       Appearance: Normal appearance. He is normal weight.   Eyes:      Pupils: Pupils are equal, round, and reactive to light.   Cardiovascular:      Rate and Rhythm: Normal rate and regular rhythm.   Pulmonary:      Effort: Pulmonary effort is normal.      Breath sounds: Normal breath sounds.   Abdominal:      General: Abdomen is flat. Bowel sounds are normal.      Palpations: Abdomen is soft.   Skin:     General: Skin is warm.   Neurological:      Mental Status: He is alert and oriented to person, place, and time. Mental status is at baseline.   Psychiatric:         Mood and Affect: Mood normal.         Assessment/Plan   Assessment & Plan  Physical exam, annual  Recommendations for men annual wellness exam:  Colonoscopy at age 45 or earlier if positive family history of colon cancer  Discuss  prostate cancer screening between ages 55-69 or sooner if family history of prostate cancer  One time screen for abdominal aortic aneurysm for men ages 65-75 who have ever smoked  Screening for lung cancer with low-dose CT in all adults age 55-77 who have a 30 pack-year smoking history and currently smoke or have quit within the past 15 years  Follow a healthy diet (Dash diet, Mediterranean diet)  Exercise 150 min/wk  Maintain healthy weight (BMI < 25)  Do not smoke  Alcohol in moderation (up to 2 drinks/day)  Get enough sleep (7-8 hours/night)  Make sure immunizations are up to date (influenza, pneumococcal, Tdap, shingles if age > 50)  Visit dentist twice yearly     Screening for colon cancer    Orders:    Colonoscopy Screening; Average Risk Patient; Future    Low mean corpuscular volume (MCV)    Orders:    CBC and Auto Differential; Future    Ferritin; Future    Iron and TIBC; Future    Melanocytic nevi of unspecified upper limb, including shoulder  Managed by dermatology        Screening for diabetes mellitus    Orders:    Comprehensive Metabolic Panel; Future    Hemoglobin A1C; Future    Lipid screening    Orders:    Lipid Panel; Future    Family history of diabetes mellitus         Encounter for hepatitis C screening test for low risk patient    Orders:    Hepatitis C antibody; Future    Melanoma in situ of skin of trunk (Multi)  Managed by dermatology        BPH with obstruction/lower urinary tract symptoms         Polymyalgia rheumatica (Multi)  Stable  Managed by rheumatology

## 2025-02-06 LAB
ALBUMIN SERPL-MCNC: 4.3 G/DL (ref 3.6–5.1)
ALP SERPL-CCNC: 70 U/L (ref 35–144)
ALT SERPL-CCNC: 17 U/L (ref 9–46)
ANION GAP SERPL CALCULATED.4IONS-SCNC: 9 MMOL/L (CALC) (ref 7–17)
AST SERPL-CCNC: 17 U/L (ref 10–35)
BASOPHILS # BLD AUTO: 8 CELLS/UL (ref 0–200)
BASOPHILS NFR BLD AUTO: 0.2 %
BILIRUB SERPL-MCNC: 1.3 MG/DL (ref 0.2–1.2)
BUN SERPL-MCNC: 17 MG/DL (ref 7–25)
CALCIUM SERPL-MCNC: 8.7 MG/DL (ref 8.6–10.3)
CHLORIDE SERPL-SCNC: 105 MMOL/L (ref 98–110)
CHOLEST SERPL-MCNC: 131 MG/DL
CHOLEST/HDLC SERPL: 3.4 (CALC)
CO2 SERPL-SCNC: 27 MMOL/L (ref 20–32)
CREAT SERPL-MCNC: 0.98 MG/DL (ref 0.7–1.35)
EGFRCR SERPLBLD CKD-EPI 2021: 87 ML/MIN/1.73M2
EOSINOPHIL # BLD AUTO: 160 CELLS/UL (ref 15–500)
EOSINOPHIL NFR BLD AUTO: 3.8 %
ERYTHROCYTE [DISTWIDTH] IN BLOOD BY AUTOMATED COUNT: 19.2 % (ref 11–15)
EST. AVERAGE GLUCOSE BLD GHB EST-MCNC: 108 MG/DL
EST. AVERAGE GLUCOSE BLD GHB EST-SCNC: 6 MMOL/L
FERRITIN SERPL-MCNC: 61 NG/ML (ref 24–380)
GLUCOSE SERPL-MCNC: 84 MG/DL (ref 65–99)
HBA1C MFR BLD: 5.4 % OF TOTAL HGB
HCT VFR BLD AUTO: 45 % (ref 38.5–50)
HCV AB SERPL QL IA: NORMAL
HDLC SERPL-MCNC: 38 MG/DL
HGB BLD-MCNC: 13.1 G/DL (ref 13.2–17.1)
IRON SATN MFR SERPL: 52 % (CALC) (ref 20–48)
IRON SERPL-MCNC: 155 MCG/DL (ref 50–180)
LDLC SERPL CALC-MCNC: 77 MG/DL (CALC)
LYMPHOCYTES # BLD AUTO: 1075 CELLS/UL (ref 850–3900)
LYMPHOCYTES NFR BLD AUTO: 25.6 %
MCH RBC QN AUTO: 19.6 PG (ref 27–33)
MCHC RBC AUTO-ENTMCNC: 29.1 G/DL (ref 32–36)
MCV RBC AUTO: 67.2 FL (ref 80–100)
MONOCYTES # BLD AUTO: 353 CELLS/UL (ref 200–950)
MONOCYTES NFR BLD AUTO: 8.4 %
NEUTROPHILS # BLD AUTO: 2604 CELLS/UL (ref 1500–7800)
NEUTROPHILS NFR BLD AUTO: 62 %
NONHDLC SERPL-MCNC: 93 MG/DL (CALC)
PLATELET # BLD AUTO: 227 THOUSAND/UL (ref 140–400)
PMV BLD REES-ECKER: ABNORMAL FL
POTASSIUM SERPL-SCNC: 4.2 MMOL/L (ref 3.5–5.3)
PROT SERPL-MCNC: 6.3 G/DL (ref 6.1–8.1)
RBC # BLD AUTO: 6.7 MILLION/UL (ref 4.2–5.8)
SERVICE CMNT-IMP: ABNORMAL
SODIUM SERPL-SCNC: 141 MMOL/L (ref 135–146)
TIBC SERPL-MCNC: 297 MCG/DL (CALC) (ref 250–425)
TRIGL SERPL-MCNC: 77 MG/DL
WBC # BLD AUTO: 4.2 THOUSAND/UL (ref 3.8–10.8)

## 2025-02-19 ENCOUNTER — APPOINTMENT (OUTPATIENT)
Dept: UROLOGY | Facility: CLINIC | Age: 65
End: 2025-02-19
Payer: COMMERCIAL

## 2025-03-19 ENCOUNTER — APPOINTMENT (OUTPATIENT)
Dept: UROLOGY | Facility: CLINIC | Age: 65
End: 2025-03-19
Payer: COMMERCIAL

## 2025-03-19 DIAGNOSIS — N52.9 ERECTILE DYSFUNCTION, UNSPECIFIED ERECTILE DYSFUNCTION TYPE: ICD-10-CM

## 2025-03-19 DIAGNOSIS — N13.8 BPH WITH OBSTRUCTION/LOWER URINARY TRACT SYMPTOMS: Primary | ICD-10-CM

## 2025-03-19 DIAGNOSIS — N40.1 BPH WITH OBSTRUCTION/LOWER URINARY TRACT SYMPTOMS: Primary | ICD-10-CM

## 2025-03-19 PROCEDURE — 51798 US URINE CAPACITY MEASURE: CPT | Performed by: UROLOGY

## 2025-03-19 PROCEDURE — 1036F TOBACCO NON-USER: CPT | Performed by: UROLOGY

## 2025-03-19 PROCEDURE — 51741 ELECTRO-UROFLOWMETRY FIRST: CPT | Performed by: UROLOGY

## 2025-03-19 PROCEDURE — 99213 OFFICE O/P EST LOW 20 MIN: CPT | Performed by: UROLOGY

## 2025-03-19 NOTE — H&P (VIEW-ONLY)
Subjective   Dani Young is a 64 y.o. male with history of BPH s/p HoLEP with me (23) and ED on Cialis 20mg; presenting today for a follow up visit. Patient is doing well. He has no bothersome urinary symptoms. Patient had a hernia repair surgery that was complicated by a large hematoma in January. He reports the quality of his erections has diminished since his surgery. It does respond to Cialis, however, he has to take it every time. He is interested in other treatment options.         Past Medical History:   Diagnosis Date    Anemia     .24: H/H 13.2/43.7    Arthritis     BPH (benign prostatic hyperplasia)     s/p TURP x 2, HoLEP    DDD (degenerative disc disease), cervical     Disease of thyroid gland     Eczema     Elevated coronary artery calcium score     2019 - CT ca score 90.79    Hypothyroidism     s/p total thyroidectomy - cancer    Inguinal hernia     Liver nodule     Malignant melanoma of other part of trunk 05/10/2019    Malignant melanoma of back    Polymyalgia rheumatica (Multi)     remission    PONV (postoperative nausea and vomiting)     Pulmonary nodules     noted on CT ca score     SCCA (squamous cell carcinoma) of skin     Thyroid cancer (Multi)     s/p total thyroidectomy + LN (2)     Past Surgical History:   Procedure Laterality Date    COLONOSCOPY      HERNIA REPAIR      umbilical    JOINT REPLACEMENT      LASIK      PROSTATE SURGERY      HoLEP    SHOULDER ARTHROSCOPY Left     THYROIDECTOMY      Thyroidectomy total    TOTAL HIP ARTHROPLASTY Bilateral     TRANSURETHRAL RESECTION OF PROSTATE      x 2    US GUIDED THYROID BIOPSY  09/10/2013    US GUIDED THYROID BIOPSY 9/10/2013 [facility] ANCILLARY LEGACY     Family History   Problem Relation Name Age of Onset    Stroke Mother OLYA     Diabetes Mother OLYA     Heart attack Mother OLYA         ACUTE MYOCARDIAL INFARCTION INVOLVING OTHER CORONARY ARTERY    Heart attack Father EDNA          AT AGE 62    Heart  disease Father EDNA      Current Outpatient Medications   Medication Sig Dispense Refill    dupilumab (DUPIXENT PEN SUBQ) Inject 300 mg under the skin every 14 (fourteen) days.      hydrocortisone 2.5 % cream Apply 1 Application topically if needed for irritation.      levothyroxine (Synthroid, Levoxyl) 175 mcg tablet Take 1 tablet (175 mcg) by mouth once daily. 90 tablet 3    oxyCODONE (Roxicodone) 5 mg immediate release tablet Take 1 tablet (5 mg) by mouth every 6 hours if needed for severe pain (7 - 10) for up to 12 doses. 12 tablet 0    tadalafil (Cialis) 10 mg tablet Take 1 tablet (10 mg) by mouth once daily as needed for erectile dysfunction. 30 tablet 6    triamcinolone (Kenalog) 0.1 % cream Apply 1 Application topically once daily as needed.      chlorhexidine (Peridex) 0.12 % solution SWISH AND SPIT 15ML FOR 30 SECONDS NIGHT PRIOR TO SURGERY AND MORNING OF SURGERY 473 mL 0     No current facility-administered medications for this visit.     Allergies   Allergen Reactions    Penicillins Unknown     Social History     Socioeconomic History    Marital status:      Spouse name: Not on file    Number of children: Not on file    Years of education: Not on file    Highest education level: Not on file   Occupational History    Not on file   Tobacco Use    Smoking status: Never    Smokeless tobacco: Never   Vaping Use    Vaping status: Never Used   Substance and Sexual Activity    Alcohol use: Never    Drug use: Never    Sexual activity: Yes     Partners: Female     Birth control/protection: Female Sterilization   Other Topics Concern    Not on file   Social History Narrative    Not on file     Social Drivers of Health     Financial Resource Strain: Not on file   Food Insecurity: Not on file   Transportation Needs: Not on file   Physical Activity: Not on file   Stress: Not on file   Social Connections: Not on file   Intimate Partner Violence: Not on file   Housing Stability: Not on file       Review of  Systems  Pertinent items are noted in HPI.    Objective       Lab Review  Lab Results   Component Value Date    WBC 4.2 02/05/2025    RBC 6.70 (H) 02/05/2025    HGB 13.1 (L) 02/05/2025    HCT 45.0 02/05/2025     02/05/2025      Lab Results   Component Value Date    BUN 17 02/05/2025    CREATININE 0.98 02/05/2025      Lab Results   Component Value Date    PSA 0.27 02/21/2024     IPSS 2 and 0  PVR 1 ml   Uroflow was poorly diagnostic; only voided 33 cc's.     Assessment/Plan   Diagnoses and all orders for this visit:  BPH with obstruction/lower urinary tract symptoms  -     Measure post void residual  -     Urine flow measurement  Erectile dysfunction, unspecified erectile dysfunction type      BPH s/p HoLEP with me (11/02/23)    Patient has no bothersome urinary symptoms.    Follow up as needed.    ED    Follow up with Dr. Li for further management of ED.     All questions were answered to the patient's satisfaction. Patient agrees with the plan and wishes to proceed. Follow-up will be scheduled appropriately.     E&M visit today is associated with current or anticipated ongoing medical care services related to a patient's single, serious condition or a complex condition.    Scribed for Dr. Magana by Yun Thompson. I , Dr Magana, have personally reviewed and agreed with the information entered by the Virtual Scribe.

## 2025-03-19 NOTE — PROGRESS NOTES
Subjective   Dani Young is a 64 y.o. male with history of BPH s/p HoLEP with me (23) and ED on Cialis 20mg; presenting today for a follow up visit. Patient is doing well. He has no bothersome urinary symptoms. Patient had a hernia repair surgery that was complicated by a large hematoma in January. He reports the quality of his erections has diminished since his surgery. It does respond to Cialis, however, he has to take it every time. He is interested in other treatment options.         Past Medical History:   Diagnosis Date    Anemia     .24: H/H 13.2/43.7    Arthritis     BPH (benign prostatic hyperplasia)     s/p TURP x 2, HoLEP    DDD (degenerative disc disease), cervical     Disease of thyroid gland     Eczema     Elevated coronary artery calcium score     2019 - CT ca score 90.79    Hypothyroidism     s/p total thyroidectomy - cancer    Inguinal hernia     Liver nodule     Malignant melanoma of other part of trunk 05/10/2019    Malignant melanoma of back    Polymyalgia rheumatica (Multi)     remission    PONV (postoperative nausea and vomiting)     Pulmonary nodules     noted on CT ca score     SCCA (squamous cell carcinoma) of skin     Thyroid cancer (Multi)     s/p total thyroidectomy + LN (2)     Past Surgical History:   Procedure Laterality Date    COLONOSCOPY      HERNIA REPAIR      umbilical    JOINT REPLACEMENT      LASIK      PROSTATE SURGERY      HoLEP    SHOULDER ARTHROSCOPY Left     THYROIDECTOMY      Thyroidectomy total    TOTAL HIP ARTHROPLASTY Bilateral     TRANSURETHRAL RESECTION OF PROSTATE      x 2    US GUIDED THYROID BIOPSY  09/10/2013    US GUIDED THYROID BIOPSY 9/10/2013 [facility] ANCILLARY LEGACY     Family History   Problem Relation Name Age of Onset    Stroke Mother OLYA     Diabetes Mother OLYA     Heart attack Mother OLYA         ACUTE MYOCARDIAL INFARCTION INVOLVING OTHER CORONARY ARTERY    Heart attack Father EDNA          AT AGE 62    Heart  disease Father EDNA      Current Outpatient Medications   Medication Sig Dispense Refill    dupilumab (DUPIXENT PEN SUBQ) Inject 300 mg under the skin every 14 (fourteen) days.      hydrocortisone 2.5 % cream Apply 1 Application topically if needed for irritation.      levothyroxine (Synthroid, Levoxyl) 175 mcg tablet Take 1 tablet (175 mcg) by mouth once daily. 90 tablet 3    oxyCODONE (Roxicodone) 5 mg immediate release tablet Take 1 tablet (5 mg) by mouth every 6 hours if needed for severe pain (7 - 10) for up to 12 doses. 12 tablet 0    tadalafil (Cialis) 10 mg tablet Take 1 tablet (10 mg) by mouth once daily as needed for erectile dysfunction. 30 tablet 6    triamcinolone (Kenalog) 0.1 % cream Apply 1 Application topically once daily as needed.      chlorhexidine (Peridex) 0.12 % solution SWISH AND SPIT 15ML FOR 30 SECONDS NIGHT PRIOR TO SURGERY AND MORNING OF SURGERY 473 mL 0     No current facility-administered medications for this visit.     Allergies   Allergen Reactions    Penicillins Unknown     Social History     Socioeconomic History    Marital status:      Spouse name: Not on file    Number of children: Not on file    Years of education: Not on file    Highest education level: Not on file   Occupational History    Not on file   Tobacco Use    Smoking status: Never    Smokeless tobacco: Never   Vaping Use    Vaping status: Never Used   Substance and Sexual Activity    Alcohol use: Never    Drug use: Never    Sexual activity: Yes     Partners: Female     Birth control/protection: Female Sterilization   Other Topics Concern    Not on file   Social History Narrative    Not on file     Social Drivers of Health     Financial Resource Strain: Not on file   Food Insecurity: Not on file   Transportation Needs: Not on file   Physical Activity: Not on file   Stress: Not on file   Social Connections: Not on file   Intimate Partner Violence: Not on file   Housing Stability: Not on file       Review of  Systems  Pertinent items are noted in HPI.    Objective       Lab Review  Lab Results   Component Value Date    WBC 4.2 02/05/2025    RBC 6.70 (H) 02/05/2025    HGB 13.1 (L) 02/05/2025    HCT 45.0 02/05/2025     02/05/2025      Lab Results   Component Value Date    BUN 17 02/05/2025    CREATININE 0.98 02/05/2025      Lab Results   Component Value Date    PSA 0.27 02/21/2024     IPSS 2 and 0  PVR 1 ml   Uroflow was poorly diagnostic; only voided 33 cc's.     Assessment/Plan   Diagnoses and all orders for this visit:  BPH with obstruction/lower urinary tract symptoms  -     Measure post void residual  -     Urine flow measurement  Erectile dysfunction, unspecified erectile dysfunction type      BPH s/p HoLEP with me (11/02/23)    Patient has no bothersome urinary symptoms.    Follow up as needed.    ED    Follow up with Dr. Li for further management of ED.     All questions were answered to the patient's satisfaction. Patient agrees with the plan and wishes to proceed. Follow-up will be scheduled appropriately.     E&M visit today is associated with current or anticipated ongoing medical care services related to a patient's single, serious condition or a complex condition.    Scribed for Dr. Magana by Yun Thompson. I , Dr Magana, have personally reviewed and agreed with the information entered by the Virtual Scribe.

## 2025-04-01 ENCOUNTER — APPOINTMENT (OUTPATIENT)
Dept: ORTHOPEDIC SURGERY | Facility: CLINIC | Age: 65
End: 2025-04-01
Payer: COMMERCIAL

## 2025-04-09 ENCOUNTER — ANESTHESIA EVENT (OUTPATIENT)
Dept: OPERATING ROOM | Facility: HOSPITAL | Age: 65
End: 2025-04-09
Payer: COMMERCIAL

## 2025-04-09 RX ORDER — DROPERIDOL 2.5 MG/ML
0.62 INJECTION, SOLUTION INTRAMUSCULAR; INTRAVENOUS ONCE AS NEEDED
Status: CANCELLED | OUTPATIENT
Start: 2025-04-09

## 2025-04-09 RX ORDER — ONDANSETRON HYDROCHLORIDE 2 MG/ML
4 INJECTION, SOLUTION INTRAVENOUS ONCE AS NEEDED
Status: CANCELLED | OUTPATIENT
Start: 2025-04-09

## 2025-04-11 ENCOUNTER — ANESTHESIA (OUTPATIENT)
Dept: OPERATING ROOM | Facility: HOSPITAL | Age: 65
End: 2025-04-11
Payer: COMMERCIAL

## 2025-04-11 ENCOUNTER — HOSPITAL ENCOUNTER (OUTPATIENT)
Dept: OPERATING ROOM | Facility: HOSPITAL | Age: 65
Setting detail: OUTPATIENT SURGERY
Discharge: HOME | End: 2025-04-11
Payer: COMMERCIAL

## 2025-04-11 VITALS
BODY MASS INDEX: 28.45 KG/M2 | RESPIRATION RATE: 15 BRPM | SYSTOLIC BLOOD PRESSURE: 139 MMHG | HEART RATE: 72 BPM | WEIGHT: 177.03 LBS | DIASTOLIC BLOOD PRESSURE: 78 MMHG | OXYGEN SATURATION: 98 % | TEMPERATURE: 97.9 F | HEIGHT: 66 IN

## 2025-04-11 DIAGNOSIS — Z12.11 SCREENING FOR COLON CANCER: ICD-10-CM

## 2025-04-11 PROCEDURE — 45378 DIAGNOSTIC COLONOSCOPY: CPT | Performed by: SURGERY

## 2025-04-11 PROCEDURE — 7100000009 HC PHASE TWO TIME - INITIAL BASE CHARGE: Performed by: ANESTHESIOLOGY

## 2025-04-11 PROCEDURE — 2500000004 HC RX 250 GENERAL PHARMACY W/ HCPCS (ALT 636 FOR OP/ED): Performed by: ANESTHESIOLOGY

## 2025-04-11 PROCEDURE — 3600000002 HC OR TIME - INITIAL BASE CHARGE - PROCEDURE LEVEL TWO: Performed by: ANESTHESIOLOGY

## 2025-04-11 PROCEDURE — 3700000002 HC GENERAL ANESTHESIA TIME - EACH INCREMENTAL 1 MINUTE: Performed by: ANESTHESIOLOGY

## 2025-04-11 PROCEDURE — 3600000007 HC OR TIME - EACH INCREMENTAL 1 MINUTE - PROCEDURE LEVEL TWO: Performed by: ANESTHESIOLOGY

## 2025-04-11 PROCEDURE — 2500000004 HC RX 250 GENERAL PHARMACY W/ HCPCS (ALT 636 FOR OP/ED): Performed by: NURSE ANESTHETIST, CERTIFIED REGISTERED

## 2025-04-11 PROCEDURE — 7100000010 HC PHASE TWO TIME - EACH INCREMENTAL 1 MINUTE: Performed by: ANESTHESIOLOGY

## 2025-04-11 PROCEDURE — 3700000001 HC GENERAL ANESTHESIA TIME - INITIAL BASE CHARGE: Performed by: ANESTHESIOLOGY

## 2025-04-11 RX ORDER — MIDAZOLAM HYDROCHLORIDE 1 MG/ML
INJECTION, SOLUTION INTRAMUSCULAR; INTRAVENOUS AS NEEDED
Status: DISCONTINUED | OUTPATIENT
Start: 2025-04-11 | End: 2025-04-11

## 2025-04-11 RX ORDER — SODIUM CHLORIDE, SODIUM LACTATE, POTASSIUM CHLORIDE, CALCIUM CHLORIDE 600; 310; 30; 20 MG/100ML; MG/100ML; MG/100ML; MG/100ML
20 INJECTION, SOLUTION INTRAVENOUS CONTINUOUS
Status: DISCONTINUED | OUTPATIENT
Start: 2025-04-11 | End: 2025-04-12 | Stop reason: HOSPADM

## 2025-04-11 RX ORDER — PROPOFOL 10 MG/ML
INJECTION, EMULSION INTRAVENOUS AS NEEDED
Status: DISCONTINUED | OUTPATIENT
Start: 2025-04-11 | End: 2025-04-11

## 2025-04-11 RX ORDER — LIDOCAINE HYDROCHLORIDE 10 MG/ML
INJECTION, SOLUTION EPIDURAL; INFILTRATION; INTRACAUDAL; PERINEURAL AS NEEDED
Status: DISCONTINUED | OUTPATIENT
Start: 2025-04-11 | End: 2025-04-11

## 2025-04-11 RX ADMIN — SODIUM CHLORIDE, SODIUM LACTATE, POTASSIUM CHLORIDE, AND CALCIUM CHLORIDE 20 ML/HR: .6; .31; .03; .02 INJECTION, SOLUTION INTRAVENOUS at 10:38

## 2025-04-11 RX ADMIN — LIDOCAINE HYDROCHLORIDE 20 MG: 10 SOLUTION INTRAVENOUS at 10:58

## 2025-04-11 RX ADMIN — PROPOFOL 65.1 MG: 10 INJECTION, EMULSION INTRAVENOUS at 10:58

## 2025-04-11 RX ADMIN — MIDAZOLAM 2 MG: 1 INJECTION INTRAMUSCULAR; INTRAVENOUS at 10:50

## 2025-04-11 RX ADMIN — PROPOFOL 140 MCG/KG/MIN: 10 INJECTION, EMULSION INTRAVENOUS at 10:59

## 2025-04-11 SDOH — HEALTH STABILITY: MENTAL HEALTH: CURRENT SMOKER: 0

## 2025-04-11 ASSESSMENT — PAIN SCALES - GENERAL
PAINLEVEL_OUTOF10: 0 - NO PAIN

## 2025-04-11 ASSESSMENT — PAIN - FUNCTIONAL ASSESSMENT
PAIN_FUNCTIONAL_ASSESSMENT: 0-10

## 2025-04-11 NOTE — ANESTHESIA POSTPROCEDURE EVALUATION
Patient: Dani Young    Procedure Summary       Date: 04/11/25 Room / Location: [facility] OR    Anesthesia Start: 1050 Anesthesia Stop: 1117    Procedure: COLONOSCOPY Diagnosis: Screening for colon cancer    Scheduled Providers: Lisa Lino MD; Otilio Louise MD; Mitchell Copeland RN Responsible Provider: Otilio Louise MD    Anesthesia Type: MAC ASA Status: 2            Anesthesia Type: MAC    Vitals Value Taken Time   /78 04/11/25 1130   Temp 36.6 °C (97.9 °F) 04/11/25 1114   Pulse 72 04/11/25 1130   Resp 15 04/11/25 1130   SpO2 98 04/11/25 1233       Anesthesia Post Evaluation    Patient location during evaluation: bedside  Patient participation: complete - patient participated  Level of consciousness: awake and alert  Pain management: adequate  Airway patency: patent  Cardiovascular status: acceptable  Respiratory status: acceptable  Hydration status: acceptable  Postoperative Nausea and Vomiting: none      No notable events documented.

## 2025-04-11 NOTE — ANESTHESIA PREPROCEDURE EVALUATION
Patient: Dani Young    Procedure Information       Date/Time: 04/11/25 1110    Scheduled providers: Lisa Lino MD; Otilio Louise MD; Mitchell Copeland RN    Procedure: COLONOSCOPY    Location: [facility] OR            Relevant Problems   Anesthesia   (+) PONV (postoperative nausea and vomiting)      Cardiac (within normal limits)      Pulmonary (within normal limits)      Neuro   (+) Bilateral carpal tunnel syndrome   (+) Carpal tunnel syndrome   (+) Cervical radiculopathy      GI (within normal limits)      /Renal   (+) BPH with obstruction/lower urinary tract symptoms   (+) Benign localized hyperplasia of prostate      Liver   (+) Liver nodule      Endocrine   (+) Hypothyroidism      Hematology   (+) Anemia      Musculoskeletal   (+) Bilateral carpal tunnel syndrome   (+) Carpal tunnel syndrome      HEENT (within normal limits)      ID (within normal limits)      Skin   (+) Eczema       Clinical information reviewed:   Tobacco  Allergies  Meds   Med Hx  Surg Hx   Fam Hx  Soc Hx        NPO Detail:  NPO/Void Status  Carbohydrate Drink Given Prior to Surgery? : N  Date of Last Liquid: 04/10/25  Time of Last Liquid: 0500  Date of Last Solid: 04/09/25  Time of Last Solid: 2000  Last Intake Type: Clear fluids  Time of Last Void: 1014         Physical Exam    Airway  Mallampati: I  TM distance: >3 FB  Neck ROM: full     Cardiovascular - normal exam     Dental - normal exam     Pulmonary - normal exam     Abdominal - normal exam         Anesthesia Plan    History of general anesthesia?: yes  History of complications of general anesthesia?: no    ASA 2     MAC     The patient is not a current smoker.    intravenous induction   Anesthetic plan and risks discussed with patient.  Use of blood products discussed with patient who consented to blood products.

## 2025-04-15 ENCOUNTER — APPOINTMENT (OUTPATIENT)
Dept: UROLOGY | Facility: CLINIC | Age: 65
End: 2025-04-15
Payer: COMMERCIAL

## 2025-04-23 ENCOUNTER — APPOINTMENT (OUTPATIENT)
Dept: UROLOGY | Facility: CLINIC | Age: 65
End: 2025-04-23
Payer: COMMERCIAL

## 2025-04-23 DIAGNOSIS — N52.9 ERECTILE DYSFUNCTION, UNSPECIFIED ERECTILE DYSFUNCTION TYPE: Primary | ICD-10-CM

## 2025-04-23 PROCEDURE — 99214 OFFICE O/P EST MOD 30 MIN: CPT | Performed by: UROLOGY

## 2025-04-23 RX ORDER — TADALAFIL 10 MG/1
10 TABLET ORAL DAILY PRN
Qty: 30 TABLET | Refills: 6 | Status: SHIPPED | OUTPATIENT
Start: 2025-04-23 | End: 2026-04-23

## 2025-04-23 NOTE — PROGRESS NOTES
"Virtual or Telephone Consent    An interactive audio and video telecommunication system which permits real time communications between the patient (at the originating site) and provider (at the distant site) was utilized to provide this telehealth service.   Verbal consent was requested and obtained from Dani Young on this date, 04/23/25 for a telehealth visit and the patient's location was confirmed at the time of the visit.    Today's visit:  Primary care, Dr Galan's note, Dr Magana's notes reviewed    NPV here for ED, referred by Dr Magana    Patient is s/p HOLEP 11/2023  Patient had a hernia repair surgery that was complicated by a large hematoma in January after which his erections have diminished   -ED on Cialis 10mg---> Happy with this  -no s/e  -sex drive, libido good  -T not checked recently     Labs  No results found for: \"TESTOSTERONE\"  No results found for: \"LH\"  No results found for: \"FSH\"  No components found for: \"ESTRADIAL\"  Lab Results   Component Value Date    PSA 0.27 02/21/2024     No components found for: \"CBC\"  No results found for: \"PROLACTIN\"  Lab Results   Component Value Date    HGBA1C 5.4 02/05/2025     No components found for: \"HEMATOCRIT\"      Medications:  Current Medications[1]    Allergy:  Allergies[2]     Exam  CONSTITUTIONAL:        No acute distress    HEAD:        Normocephalic and atraumatic    CHEST / RESPIRATORY      no excess work of breathing, no respiratory distress,    ABDOMEN / GASTROINTESTINAL:        Abdomen nondistended        Assessment/Plan  #Erectile Dysfunction: I discussed the etiology and different treatment modalities for erectile dysfunction. Specifically, we talked about lifestyle factors like smoking, diet, and exercise. We also discussed ED as a sign of systemic disease, and the contributions of elevated cholesterol and elevated blood sugars on erections. We then discussed treatment modalities, specifically PDE5 inhibitors, intracavernosal injections, vacuum " erectile devices, and penile prostheses.    -will check testosterone and prolactin levels   -continue Cialis 10mg - medication counseling done. Discussed side effects including priapism, headaches, stuffiness, and back pain. Discussed that if he gets an erection >4 hours, he needs to present to the emergency room or risk worsening of his erectile dysfunction long term.     Fu in 1 year with Mind[facility] [employer]  By signing my name below, ISue Vera Matos   attest that this documentation has been prepared under the direction and in the presence of Ronald Li MD.         [1]   Current Outpatient Medications:     chlorhexidine (Peridex) 0.12 % solution, SWISH AND SPIT 15ML FOR 30 SECONDS NIGHT PRIOR TO SURGERY AND MORNING OF SURGERY, Disp: 473 mL, Rfl: 0    dupilumab (DUPIXENT PEN SUBQ), Inject 300 mg under the skin every 14 (fourteen) days., Disp: , Rfl:     hydrocortisone 2.5 % cream, Apply 1 Application topically if needed for irritation., Disp: , Rfl:     levothyroxine (Synthroid, Levoxyl) 175 mcg tablet, Take 1 tablet (175 mcg) by mouth once daily., Disp: 90 tablet, Rfl: 3    oxyCODONE (Roxicodone) 5 mg immediate release tablet, Take 1 tablet (5 mg) by mouth every 6 hours if needed for severe pain (7 - 10) for up to 12 doses. (Patient not taking: Reported on 4/11/2025), Disp: 12 tablet, Rfl: 0    tadalafil (Cialis) 10 mg tablet, Take 1 tablet (10 mg) by mouth once daily as needed for erectile dysfunction., Disp: 30 tablet, Rfl: 6    triamcinolone (Kenalog) 0.1 % cream, Apply 1 Application topically once daily as needed., Disp: , Rfl:   [2]   Allergies  Allergen Reactions    Penicillins Unknown

## 2025-06-23 DIAGNOSIS — M54.16 LUMBAR RADICULOPATHY: Primary | ICD-10-CM

## 2025-06-23 RX ORDER — PREDNISONE 20 MG/1
20 TABLET ORAL DAILY
Qty: 7 TABLET | Refills: 2 | Status: SHIPPED | OUTPATIENT
Start: 2025-06-23

## 2025-06-23 RX ORDER — METHOCARBAMOL 500 MG/1
TABLET, FILM COATED ORAL
Qty: 60 TABLET | Refills: 2 | Status: SHIPPED | OUTPATIENT
Start: 2025-06-23

## 2025-06-23 NOTE — PROGRESS NOTES
Several years of acute on chronic flares of low back pain.  MRI showed no evidence of significant stenosis.  When the pain flares he has difficulty ambulating, walks with a cane.  He is unable to complete basic ADLs.  He has tried medication management and physical therapy. I am recommending TENS and NMES therapy, he was given a prescription for a new device.  He will follow-up with me on an as-needed basis.

## 2025-12-12 ENCOUNTER — APPOINTMENT (OUTPATIENT)
Facility: CLINIC | Age: 65
End: 2025-12-12
Payer: COMMERCIAL

## 2026-02-06 ENCOUNTER — APPOINTMENT (OUTPATIENT)
Dept: PRIMARY CARE | Facility: CLINIC | Age: 66
End: 2026-02-06
Payer: COMMERCIAL

## (undated) DEVICE — Device

## (undated) DEVICE — CATHETER, LASER URETERAL, 7.1FR, 40CM

## (undated) DEVICE — IRRIGATION SET, CYSTOSCOPY, TURP, Y, CONTINUOUS, 81 IN

## (undated) DEVICE — CARE KIT, LAPAROSCOPIC, ADVANCED

## (undated) DEVICE — TROCAR SYSTEM, BALLOON, KII GELPORT, 12 X 100MM

## (undated) DEVICE — SUTURE, MONOCRYL, 4-0, 18 IN, PS2, UNDYED

## (undated) DEVICE — GUIDEWIRE, DUAL SENSOR, .035 X 150 STRAIGHT,  3CM

## (undated) DEVICE — SOLUTION, IRRIGATION, STERILE WATER, 1000 ML, POUR BOTTLE

## (undated) DEVICE — TUBING, ELLIK, FOR ELLIK/TOOMEY ADAPTERS

## (undated) DEVICE — BAG, DRAINAGE, CONTINUOUS IRRIGATION, 4L

## (undated) DEVICE — ADHESIVE, SKIN, DERMABOND ADVANCED, 15CM, PEN-STYLE

## (undated) DEVICE — SUTURE, VICRYL PLUS, 0, 27IN, UR-6, VIOLET, BRAIDED

## (undated) DEVICE — GLOVE, PROTEXIS PI CLASSIC, SZ-6.5, PF, LF

## (undated) DEVICE — TRAY, FOLEY, LUBRI-SIL, 16FR, COMPLETE CARE W/STATLOCK

## (undated) DEVICE — SEAL, UNIVERSAL, 5-12MM

## (undated) DEVICE — DRAPE, ARM XI

## (undated) DEVICE — SUTURE, VICRYL, 3-0, 27 IN, SH, VIOLET

## (undated) DEVICE — OBTURATOR, BLADELESS , SU

## (undated) DEVICE — BLADE, ROTATION MORCELLATOR, 4.8MM X 335MM, PIRHANA, DISPOSABLE

## (undated) DEVICE — UROVAC BLADDER EVACUATOR

## (undated) DEVICE — TUBE SET, PNEUMOLAR HEATED, SMOKE EVACU, HIGH-FLOW

## (undated) DEVICE — GLOVE, SURGICAL, PROTEXIS PI BLUE W/NEUTHERA, 8.0, PF, LF

## (undated) DEVICE — TUBING, MORCELLATOR PUMP, DISPOSABLE

## (undated) DEVICE — SUTURE, VICRYL, 0, 27 IN, UR-6, VIOLET

## (undated) DEVICE — 24FR, 3-WAY, 30CC, BARDEX LUBRICATH HEMATURIA LATEX FOLEY CATHETER, COUDE TIP

## (undated) DEVICE — GOWN, SURGICAL, SIRUS, NON REINFORCED, LARGE

## (undated) DEVICE — SYRINGE, 20 CC, LUER LOCK

## (undated) DEVICE — DRAPE, COLUMN, DAVINCI XI

## (undated) DEVICE — COVER, TIP HOT SHEARS ENDOWRIST

## (undated) DEVICE — LUBRICANT, ELECTROLUBE, F/ELECTRODE TIPS

## (undated) DEVICE — ELECTRODE, ELECTROSURGICAL, BLADE, INSULATED, ENT/IMA, STERILE

## (undated) DEVICE — SYRINGE, TOOMEY, IRRIGATION, 60ML, INDIVIDUAL WRAP, STERILE

## (undated) DEVICE — SUTURE, STRATAFIX, 3-0 SPIRAL PDS PLUS, 15CM SH VIOLET